# Patient Record
Sex: MALE | Race: WHITE | NOT HISPANIC OR LATINO | Employment: OTHER | URBAN - METROPOLITAN AREA
[De-identification: names, ages, dates, MRNs, and addresses within clinical notes are randomized per-mention and may not be internally consistent; named-entity substitution may affect disease eponyms.]

---

## 2020-05-29 ENCOUNTER — HISTORICAL (OUTPATIENT)
Dept: LAB | Facility: HOSPITAL | Age: 60
End: 2020-05-29

## 2021-08-13 ENCOUNTER — HISTORICAL (OUTPATIENT)
Dept: LAB | Facility: HOSPITAL | Age: 61
End: 2021-08-13

## 2021-08-13 LAB
ABS NEUT (OLG): 3.08 X10(3)/MCL (ref 2.1–9.2)
ALBUMIN SERPL-MCNC: 3.8 GM/DL (ref 3.4–4.8)
ALBUMIN/GLOB SERPL: 1.2 RATIO (ref 1.1–2)
ALP SERPL-CCNC: 73 UNIT/L (ref 40–150)
ALT SERPL-CCNC: 30 UNIT/L (ref 0–55)
AST SERPL-CCNC: 28 UNIT/L (ref 5–34)
BILIRUB SERPL-MCNC: 0.6 MG/DL (ref 0.2–1.2)
BILIRUBIN DIRECT+TOT PNL SERPL-MCNC: 0.2 MG/DL (ref 0–0.5)
BILIRUBIN DIRECT+TOT PNL SERPL-MCNC: 0.4 MG/DL (ref 0–0.8)
BUN SERPL-MCNC: 19.2 MG/DL (ref 8.4–25.7)
CALCIUM SERPL-MCNC: 9.6 MG/DL (ref 8.8–10)
CHLORIDE SERPL-SCNC: 104 MMOL/L (ref 98–107)
CHOLEST SERPL-MCNC: 200 MG/DL
CHOLEST/HDLC SERPL: 5 {RATIO} (ref 0–5)
CO2 SERPL-SCNC: 31 MMOL/L (ref 23–31)
CORTIS SERPL-SCNC: 9.2 UG/DL
CREAT SERPL-MCNC: 1.24 MG/DL (ref 0.72–1.25)
DEPRECATED CALCIDIOL+CALCIFEROL SERPL-MC: 68.9 NG/ML (ref 30–80)
ERYTHROCYTE [DISTWIDTH] IN BLOOD BY AUTOMATED COUNT: 14.4 % (ref 11.5–17)
ESTRADIOL SERPL HS-MCNC: <24 PG/ML
GLOBULIN SER-MCNC: 3.3 GM/DL (ref 2.4–3.5)
GLUCOSE SERPL-MCNC: 102 MG/DL (ref 82–115)
HCT VFR BLD AUTO: 44.2 % (ref 42–52)
HDLC SERPL-MCNC: 43 MG/DL (ref 40–60)
HGB BLD-MCNC: 13.6 GM/DL (ref 14–18)
LDLC SERPL CALC-MCNC: 127 MG/DL (ref 50–140)
MCH RBC QN AUTO: 27.9 PG (ref 27–31)
MCHC RBC AUTO-ENTMCNC: 30.8 GM/DL (ref 33–36)
MCV RBC AUTO: 90.6 FL (ref 80–94)
NRBC BLD AUTO-RTO: 0 % (ref 0–0.2)
PLATELET # BLD AUTO: 299 X10(3)/MCL (ref 130–400)
PMV BLD AUTO: 9.5 FL (ref 7.4–10.4)
POTASSIUM SERPL-SCNC: 4.2 MMOL/L (ref 3.5–5.1)
PROT SERPL-MCNC: 7.1 GM/DL (ref 5.8–7.6)
PSA SERPL-MCNC: 0.82 NG/ML
RBC # BLD AUTO: 4.88 X10(6)/MCL (ref 4.7–6.1)
SODIUM SERPL-SCNC: 143 MMOL/L (ref 136–145)
T3FREE SERPL-MCNC: 2.53 PG/ML (ref 1.58–3.91)
T4 FREE SERPL-MCNC: 0.75 NG/DL (ref 0.7–1.48)
TESTOST SERPL-MCNC: 1146.6 NG/DL (ref 220.91–715.81)
TRIGL SERPL-MCNC: 148 MG/DL (ref 0–150)
TSH SERPL-ACNC: 1.04 UIU/ML (ref 0.35–4.94)
VIT B12 SERPL-MCNC: 763 PG/ML (ref 213–816)
VLDLC SERPL CALC-MCNC: 30 MG/DL
WBC # SPEC AUTO: 5.8 X10(3)/MCL (ref 4.5–11.5)

## 2022-07-11 ENCOUNTER — HOSPITAL ENCOUNTER (OUTPATIENT)
Dept: RADIOLOGY | Facility: HOSPITAL | Age: 62
Discharge: HOME | End: 2022-07-11
Attending: INTERNAL MEDICINE
Payer: COMMERCIAL

## 2022-07-11 DIAGNOSIS — R06.02 SOB (SHORTNESS OF BREATH) ON EXERTION: ICD-10-CM

## 2022-07-11 DIAGNOSIS — Z79.899 HIGH RISK MEDICATION USE: ICD-10-CM

## 2022-07-11 PROCEDURE — 71046 X-RAY EXAM CHEST 2 VIEWS: CPT | Mod: TC

## 2022-07-28 PROBLEM — E78.00 PURE HYPERCHOLESTEROLEMIA: Chronic | Status: ACTIVE | Noted: 2022-07-28

## 2022-07-28 PROBLEM — D50.9 MICROCYTIC ANEMIA: Chronic | Status: ACTIVE | Noted: 2022-07-28

## 2022-07-28 PROBLEM — E67.3 HYPERVITAMINOSIS D: Status: ACTIVE | Noted: 2022-07-28

## 2022-07-28 PROBLEM — E67.3 HYPERVITAMINOSIS D: Chronic | Status: ACTIVE | Noted: 2022-07-28

## 2022-07-28 PROBLEM — N18.31 STAGE 3A CHRONIC KIDNEY DISEASE: Status: ACTIVE | Noted: 2022-07-28

## 2022-07-28 PROBLEM — N18.31 STAGE 3A CHRONIC KIDNEY DISEASE: Chronic | Status: ACTIVE | Noted: 2022-07-28

## 2022-07-28 PROBLEM — D50.9 MICROCYTIC ANEMIA: Status: ACTIVE | Noted: 2022-07-28

## 2022-07-28 PROBLEM — E78.00 PURE HYPERCHOLESTEROLEMIA: Status: ACTIVE | Noted: 2022-07-28

## 2022-08-11 PROBLEM — Z86.16 PERSONAL HISTORY OF COVID-19: Status: ACTIVE | Noted: 2022-08-11

## 2022-08-11 PROBLEM — U09.9 POST-COVID SYNDROME: Status: ACTIVE | Noted: 2022-08-11

## 2023-02-07 ENCOUNTER — OFFICE VISIT (OUTPATIENT)
Dept: CARDIAC SURGERY | Facility: CLINIC | Age: 63
End: 2023-02-07
Payer: COMMERCIAL

## 2023-02-07 VITALS
BODY MASS INDEX: 23.52 KG/M2 | SYSTOLIC BLOOD PRESSURE: 115 MMHG | DIASTOLIC BLOOD PRESSURE: 74 MMHG | HEART RATE: 122 BPM | HEIGHT: 71 IN | WEIGHT: 168 LBS

## 2023-02-07 DIAGNOSIS — R13.10 DYSPHAGIA, UNSPECIFIED TYPE: ICD-10-CM

## 2023-02-07 DIAGNOSIS — C15.9 MALIGNANT NEOPLASM OF ESOPHAGUS, UNSPECIFIED LOCATION: Primary | ICD-10-CM

## 2023-02-07 DIAGNOSIS — J84.9 INTERSTITIAL LUNG DISEASE: Primary | ICD-10-CM

## 2023-02-07 PROCEDURE — 3078F PR MOST RECENT DIASTOLIC BLOOD PRESSURE < 80 MM HG: ICD-10-PCS | Mod: CPTII,,, | Performed by: SPECIALIST

## 2023-02-07 PROCEDURE — 3008F PR BODY MASS INDEX (BMI) DOCUMENTED: ICD-10-PCS | Mod: CPTII,,, | Performed by: SPECIALIST

## 2023-02-07 PROCEDURE — 3078F DIAST BP <80 MM HG: CPT | Mod: CPTII,,, | Performed by: SPECIALIST

## 2023-02-07 PROCEDURE — 1159F PR MEDICATION LIST DOCUMENTED IN MEDICAL RECORD: ICD-10-PCS | Mod: CPTII,,, | Performed by: SPECIALIST

## 2023-02-07 PROCEDURE — 3074F PR MOST RECENT SYSTOLIC BLOOD PRESSURE < 130 MM HG: ICD-10-PCS | Mod: CPTII,,, | Performed by: SPECIALIST

## 2023-02-07 PROCEDURE — 99203 OFFICE O/P NEW LOW 30 MIN: CPT | Mod: ,,, | Performed by: SPECIALIST

## 2023-02-07 PROCEDURE — 3008F BODY MASS INDEX DOCD: CPT | Mod: CPTII,,, | Performed by: SPECIALIST

## 2023-02-07 PROCEDURE — 1159F MED LIST DOCD IN RCRD: CPT | Mod: CPTII,,, | Performed by: SPECIALIST

## 2023-02-07 PROCEDURE — 3074F SYST BP LT 130 MM HG: CPT | Mod: CPTII,,, | Performed by: SPECIALIST

## 2023-02-07 PROCEDURE — 99203 PR OFFICE/OUTPT VISIT, NEW, LEVL III, 30-44 MIN: ICD-10-PCS | Mod: ,,, | Performed by: SPECIALIST

## 2023-02-07 RX ORDER — MONTELUKAST SODIUM 10 MG/1
10 TABLET ORAL
Status: ON HOLD | COMMUNITY
Start: 2022-08-20 | End: 2023-02-22 | Stop reason: HOSPADM

## 2023-02-07 RX ORDER — ACETAMINOPHEN 500 MG
400 TABLET ORAL
Status: ON HOLD | COMMUNITY
End: 2023-03-15

## 2023-02-07 RX ORDER — THYROID 60 MG/1
60 TABLET ORAL
Status: ON HOLD | COMMUNITY
Start: 2022-04-03 | End: 2023-02-22 | Stop reason: HOSPADM

## 2023-02-07 RX ORDER — ENOXAPARIN SODIUM 100 MG/ML
80 INJECTION SUBCUTANEOUS
Status: ON HOLD | COMMUNITY
End: 2023-02-22 | Stop reason: HOSPADM

## 2023-02-07 RX ORDER — ESOMEPRAZOLE MAGNESIUM 40 MG/1
40 CAPSULE, DELAYED RELEASE ORAL
Status: ON HOLD | COMMUNITY
Start: 2022-09-14 | End: 2023-03-15

## 2023-02-07 NOTE — PROGRESS NOTES
[facility]  History & Physical  Cardiothoracic Surgery    SUBJECTIVE:     Chief Complaint/Reason for Visit:   Chief Complaint   Patient presents with    Pre-op Exam     Ref  Dr Maximo Ellis Lung Biopsy for ILD        History of Present Illness:  Patient is a 62 y.o. male presents referred by Dr. Ellis for a open lung biopsy.  The patient has interstitial lung disease of uncertain etiology.  He is on chronic steroids.  The patient also had a recent diagnosis of esophageal cancer that was treated at [facility] with adjuvant therapy.  He is currently walking around with no oxygen but does require oxygen most of the day.  He gets short of breath with minimal activity and has a chronic cough.  On review of his CT scan there is diffuse interstitial disease bilaterally.  It does appear to be slightly more involved on the right.  We will plan for a right VATS lung biopsy of the upper and lower lobes.  I have discussed the risks, benefits, and alternatives in great detail with the patient and his wife.    Review of patient's allergies indicates:  No Known Allergies    Past Medical History:   Diagnosis Date    COVID-19     Esophageal cancer     cT2-3N1    HLD (hyperlipidemia)     Hypervitaminosis D 07/28/2022    Microcytic anemia 07/28/2022    Pure hypercholesterolemia 07/28/2022    Stage 3a chronic kidney disease 07/28/2022     Past Surgical History:   Procedure Laterality Date    BL strabismus surgery  1973     Family History   Problem Relation Age of Onset    Coronary artery disease Mother     Diabetes Mother     Heart failure Father      Social History     Tobacco Use    Smoking status: Never     Passive exposure: Past    Smokeless tobacco: Never        Review of Systems:  Review of Systems   Constitutional: Negative.   HENT: Negative.     Eyes: Negative.    Cardiovascular:  Positive for dyspnea on exertion.   Respiratory:  Positive for shortness of breath.    Endocrine: Negative.     Hematologic/Lymphatic: Negative.    Skin: Negative.    Musculoskeletal: Negative.    Gastrointestinal: Negative.    Genitourinary: Negative.    Neurological: Negative.    Psychiatric/Behavioral: Negative.     Allergic/Immunologic: Negative.      OBJECTIVE:     Vital Signs (Most Recent):  Pulse: (!) 122 (02/07/23 0940)  BP: 115/74 (02/07/23 0940)    Admission: Weight: 76.2 kg (168 lb) (02/07/23 0940)   Most Recent: Weight: 76.2 kg (168 lb) (02/07/23 0940)    Physical Exam:  Physical Exam  Vitals reviewed.   Constitutional:       Appearance: Normal appearance.   HENT:      Head: Normocephalic and atraumatic.   Eyes:      Pupils: Pupils are equal, round, and reactive to light.   Cardiovascular:      Rate and Rhythm: Normal rate and regular rhythm.      Pulses: Normal pulses.      Heart sounds: Normal heart sounds.   Pulmonary:      Effort: Pulmonary effort is normal.      Comments: Somewhat coarse breath sounds bilaterally  Abdominal:      Palpations: Abdomen is soft.   Musculoskeletal:         General: Normal range of motion.      Cervical back: Normal range of motion.   Skin:     General: Skin is warm.   Neurological:      General: No focal deficit present.      Mental Status: He is alert and oriented to person, place, and time.   Psychiatric:         Mood and Affect: Mood normal.         Behavior: Behavior normal.       Diagnostic Results:  Reviewed CT scan and PFT results      ASSESSMENT/PLAN:     1. Dysphagia, unspecified type    Bilateral interstitial lung disease   Esophageal cancer status post adjuvant therapy  Chronic kidney disease stage 3   Microcytic anemia   Hyperlipidemia   Steroid dependency  Planning right VATS lung biopsy

## 2023-02-07 NOTE — H&P (VIEW-ONLY)
[facility]  History & Physical  Cardiothoracic Surgery    SUBJECTIVE:     Chief Complaint/Reason for Visit:   Chief Complaint   Patient presents with    Pre-op Exam     Ref  Dr Maximo Ellis Lung Biopsy for ILD        History of Present Illness:  Patient is a 62 y.o. male presents referred by Dr. Ellis for a open lung biopsy.  The patient has interstitial lung disease of uncertain etiology.  He is on chronic steroids.  The patient also had a recent diagnosis of esophageal cancer that was treated at [facility] with adjuvant therapy.  He is currently walking around with no oxygen but does require oxygen most of the day.  He gets short of breath with minimal activity and has a chronic cough.  On review of his CT scan there is diffuse interstitial disease bilaterally.  It does appear to be slightly more involved on the right.  We will plan for a right VATS lung biopsy of the upper and lower lobes.  I have discussed the risks, benefits, and alternatives in great detail with the patient and his wife.    Review of patient's allergies indicates:  No Known Allergies    Past Medical History:   Diagnosis Date    COVID-19     Esophageal cancer     cT2-3N1    HLD (hyperlipidemia)     Hypervitaminosis D 07/28/2022    Microcytic anemia 07/28/2022    Pure hypercholesterolemia 07/28/2022    Stage 3a chronic kidney disease 07/28/2022     Past Surgical History:   Procedure Laterality Date    BL strabismus surgery  1973     Family History   Problem Relation Age of Onset    Coronary artery disease Mother     Diabetes Mother     Heart failure Father      Social History     Tobacco Use    Smoking status: Never     Passive exposure: Past    Smokeless tobacco: Never        Review of Systems:  Review of Systems   Constitutional: Negative.   HENT: Negative.     Eyes: Negative.    Cardiovascular:  Positive for dyspnea on exertion.   Respiratory:  Positive for shortness of breath.    Endocrine: Negative.     Hematologic/Lymphatic: Negative.    Skin: Negative.    Musculoskeletal: Negative.    Gastrointestinal: Negative.    Genitourinary: Negative.    Neurological: Negative.    Psychiatric/Behavioral: Negative.     Allergic/Immunologic: Negative.      OBJECTIVE:     Vital Signs (Most Recent):  Pulse: (!) 122 (02/07/23 0940)  BP: 115/74 (02/07/23 0940)    Admission: Weight: 76.2 kg (168 lb) (02/07/23 0940)   Most Recent: Weight: 76.2 kg (168 lb) (02/07/23 0940)    Physical Exam:  Physical Exam  Vitals reviewed.   Constitutional:       Appearance: Normal appearance.   HENT:      Head: Normocephalic and atraumatic.   Eyes:      Pupils: Pupils are equal, round, and reactive to light.   Cardiovascular:      Rate and Rhythm: Normal rate and regular rhythm.      Pulses: Normal pulses.      Heart sounds: Normal heart sounds.   Pulmonary:      Effort: Pulmonary effort is normal.      Comments: Somewhat coarse breath sounds bilaterally  Abdominal:      Palpations: Abdomen is soft.   Musculoskeletal:         General: Normal range of motion.      Cervical back: Normal range of motion.   Skin:     General: Skin is warm.   Neurological:      General: No focal deficit present.      Mental Status: He is alert and oriented to person, place, and time.   Psychiatric:         Mood and Affect: Mood normal.         Behavior: Behavior normal.       Diagnostic Results:  Reviewed CT scan and PFT results      ASSESSMENT/PLAN:     1. Dysphagia, unspecified type    Bilateral interstitial lung disease   Esophageal cancer status post adjuvant therapy  Chronic kidney disease stage 3   Microcytic anemia   Hyperlipidemia   Steroid dependency  Planning right VATS lung biopsy

## 2023-02-08 ENCOUNTER — HOSPITAL ENCOUNTER (OUTPATIENT)
Dept: RADIOLOGY | Facility: HOSPITAL | Age: 63
Discharge: HOME | End: 2023-02-08
Attending: INTERNAL MEDICINE
Payer: COMMERCIAL

## 2023-02-08 ENCOUNTER — CLINICAL SUPPORT (OUTPATIENT)
Dept: REHABILITATION | Facility: HOSPITAL | Age: 63
End: 2023-02-08
Attending: INTERNAL MEDICINE
Payer: COMMERCIAL

## 2023-02-08 DIAGNOSIS — J84.9 ILD (INTERSTITIAL LUNG DISEASE): ICD-10-CM

## 2023-02-08 DIAGNOSIS — R13.10 DYSPHAGIA, UNSPECIFIED TYPE: ICD-10-CM

## 2023-02-08 PROCEDURE — 74230 X-RAY XM SWLNG FUNCJ C+: CPT | Mod: TC

## 2023-02-08 PROCEDURE — 92611 MOTION FLUOROSCOPY/SWALLOW: CPT

## 2023-02-08 NOTE — PROGRESS NOTES
Speech Language Pathology Department  Modified Barium Swallow Study    Patient Name:  Eagle Patterson   MRN:  [id number]      Recommendations:     Repeat MBS study: Not indicated at this time  Diet recommendations:  Regular solids and thin liquids  Swallow strategies/precautions: small bites/sips, slow rate, and medications per patient preference  General precautions: Standard,      Reason for Referral:     A Modified Barium Swallow Study was completed to assess the efficiency of his/her swallow function, rule out aspiration and make recommendations regarding safe dietary consistencies, effective compensatory strategies, and safe eating environment.     History:     Past Medical History:   Diagnosis Date    COVID-19     Esophageal cancer     cT2-3N1    HLD (hyperlipidemia)     Hypervitaminosis D 07/28/2022    Microcytic anemia 07/28/2022    Pure hypercholesterolemia 07/28/2022    Stage 3a chronic kidney disease 07/28/2022     Past Surgical History:   Procedure Laterality Date    BL strabismus surgery  1973       Home Diet: Regular and thin liquids    Patient complaint: Pt with no complaints at this time. States he occasionally chokes while eating and drinking.     Subjective:     Patient alert, calm, and cooperative.    Spiritual/Cultural/Denominational Beliefs/Practices that affect care: no  Pain/Comfort: none  Respiratory Status: Nasal cannula    Fluoroscopic Results:     Oral Musculature Evaluation:  Adequate dentition  Adequate secretion management    Visualization  Patient was seen in the lateral view    Oral Phase:   Adequate lip closure  Adequate bolus formation  Adequate anterior-posterior transport  Adequate mastication    Pharyngeal Phase:   Swallow delay with spill to the pyriform sinuses  Reduced base of tongue retraction  Reduced epiglottic deflection  Adequate airway protection  Min residue observed and cleared with a secondary swallow   Consistency Laryngeal Penetration Aspiration   Mildly thick  liquid by spoon None None   Mildly thick liquid by cup None None   Thin liquid by cup None None   Puree None None   Chewable solid None None   Thin liquid by straw None None       Cervical Esophageal Phase:   residual material visualized; no retrograde movement observed      Assessment:     Pt with WFL oropharyngeal swallow with no laryngeal penetration/aspiration observed.     Goals:     Patient Education:     Patient and spouse provided with verbal education regarding results/recommendations.  Understanding was verbalized.    Time Tracking:     SLP Treatment Date:   2/8/23  Speech Total Time (min):  30    Billable minutes:   Motion Fluoroscopic Evaluation, Video Recording, 30 02/08/2023

## 2023-02-14 ENCOUNTER — ANESTHESIA EVENT (OUTPATIENT)
Dept: SURGERY | Facility: HOSPITAL | Age: 63
DRG: 166 | End: 2023-02-14
Payer: COMMERCIAL

## 2023-02-14 ENCOUNTER — HOSPITAL ENCOUNTER (OUTPATIENT)
Dept: RADIOLOGY | Facility: HOSPITAL | Age: 63
Discharge: HOME | DRG: 166 | End: 2023-02-14
Attending: SPECIALIST
Payer: COMMERCIAL

## 2023-02-14 DIAGNOSIS — J84.9 INTERSTITIAL LUNG DISEASE: ICD-10-CM

## 2023-02-14 PROCEDURE — 71046 X-RAY EXAM CHEST 2 VIEWS: CPT | Mod: TC

## 2023-02-15 ENCOUNTER — ANESTHESIA (OUTPATIENT)
Dept: SURGERY | Facility: HOSPITAL | Age: 63
DRG: 166 | End: 2023-02-15
Payer: COMMERCIAL

## 2023-02-15 ENCOUNTER — HOSPITAL ENCOUNTER (INPATIENT)
Facility: HOSPITAL | Age: 63
LOS: 7 days | Discharge: NURSING, REHAB OR LONG-TERM CARE | DRG: 166 | End: 2023-02-22
Attending: SPECIALIST | Admitting: SPECIALIST
Payer: COMMERCIAL

## 2023-02-15 DIAGNOSIS — J84.9 INTERSTITIAL LUNG DISEASE: ICD-10-CM

## 2023-02-15 DIAGNOSIS — J96.21 ACUTE AND CHRONIC RESPIRATORY FAILURE WITH HYPOXIA: ICD-10-CM

## 2023-02-15 DIAGNOSIS — R91.8 LUNG MASS: ICD-10-CM

## 2023-02-15 LAB
BASOPHILS # BLD AUTO: 0 X10(3)/MCL (ref 0–0.2)
BASOPHILS # BLD AUTO: 0.02 X10(3)/MCL (ref 0–0.2)
BASOPHILS NFR BLD AUTO: 0 %
BASOPHILS NFR BLD AUTO: 0.2 %
EOSINOPHIL # BLD AUTO: 0.01 X10(3)/MCL (ref 0–0.9)
EOSINOPHIL # BLD AUTO: 0.01 X10(3)/MCL (ref 0–0.9)
EOSINOPHIL NFR BLD AUTO: 0.1 %
EOSINOPHIL NFR BLD AUTO: 0.2 %
ERYTHROCYTE [DISTWIDTH] IN BLOOD BY AUTOMATED COUNT: 19.9 % (ref 11.5–17)
ERYTHROCYTE [DISTWIDTH] IN BLOOD BY AUTOMATED COUNT: 19.9 % (ref 11.5–17)
HCT VFR BLD AUTO: 23.5 % (ref 42–52)
HCT VFR BLD AUTO: 47.2 % (ref 42–52)
HGB BLD-MCNC: 14.7 GM/DL (ref 14–18)
HGB BLD-MCNC: 6.8 GM/DL (ref 14–18)
IMM GRANULOCYTES # BLD AUTO: 0.02 X10(3)/MCL (ref 0–0.04)
IMM GRANULOCYTES # BLD AUTO: 0.06 X10(3)/MCL (ref 0–0.04)
IMM GRANULOCYTES NFR BLD AUTO: 0.4 %
IMM GRANULOCYTES NFR BLD AUTO: 0.6 %
LYMPHOCYTES # BLD AUTO: 0.08 X10(3)/MCL (ref 0.6–4.6)
LYMPHOCYTES # BLD AUTO: 0.16 X10(3)/MCL (ref 0.6–4.6)
LYMPHOCYTES NFR BLD AUTO: 1.5 %
LYMPHOCYTES NFR BLD AUTO: 1.7 %
MCH RBC QN AUTO: 29.1 PG
MCH RBC QN AUTO: 29.3 PG
MCHC RBC AUTO-ENTMCNC: 28.9 MG/DL (ref 33–36)
MCHC RBC AUTO-ENTMCNC: 31.1 MG/DL (ref 33–36)
MCV RBC AUTO: 101.3 FL (ref 80–94)
MCV RBC AUTO: 93.3 FL (ref 80–94)
MONOCYTES # BLD AUTO: 0.28 X10(3)/MCL (ref 0.1–1.3)
MONOCYTES # BLD AUTO: 0.8 X10(3)/MCL (ref 0.1–1.3)
MONOCYTES NFR BLD AUTO: 6 %
MONOCYTES NFR BLD AUTO: 7.7 %
NEUTROPHILS # BLD AUTO: 4.31 X10(3)/MCL (ref 2.1–9.2)
NEUTROPHILS # BLD AUTO: 9.3 X10(3)/MCL (ref 2.1–9.2)
NEUTROPHILS NFR BLD AUTO: 89.9 %
NEUTROPHILS NFR BLD AUTO: 91.7 %
NRBC BLD AUTO-RTO: 0 %
NRBC BLD AUTO-RTO: 0 %
PLATELET # BLD AUTO: 119 X10(3)/MCL (ref 130–400)
PLATELET # BLD AUTO: 264 X10(3)/MCL (ref 130–400)
PMV BLD AUTO: 9.8 FL (ref 7.4–10.4)
PMV BLD AUTO: 9.9 FL (ref 7.4–10.4)
RBC # BLD AUTO: 2.32 X10(6)/MCL (ref 4.7–6.1)
RBC # BLD AUTO: 5.06 X10(6)/MCL (ref 4.7–6.1)
WBC # SPEC AUTO: 10.4 X10(3)/MCL (ref 4.5–11.5)
WBC # SPEC AUTO: 4.7 X10(3)/MCL (ref 4.5–11.5)

## 2023-02-15 PROCEDURE — 27000221 HC OXYGEN, UP TO 24 HOURS

## 2023-02-15 PROCEDURE — 63600175 PHARM REV CODE 636 W HCPCS: Performed by: SPECIALIST

## 2023-02-15 PROCEDURE — 63600175 PHARM REV CODE 636 W HCPCS

## 2023-02-15 PROCEDURE — 71000016 HC POSTOP RECOV ADDL HR: Performed by: SPECIALIST

## 2023-02-15 PROCEDURE — 37000009 HC ANESTHESIA EA ADD 15 MINS: Performed by: SPECIALIST

## 2023-02-15 PROCEDURE — 85025 COMPLETE CBC W/AUTO DIFF WBC: CPT | Performed by: SPECIALIST

## 2023-02-15 PROCEDURE — 21400001 HC TELEMETRY ROOM

## 2023-02-15 PROCEDURE — 36000710: Performed by: SPECIALIST

## 2023-02-15 PROCEDURE — 32607 THORACOSCOPY W/BX INFILTRATE: CPT | Mod: RT,,, | Performed by: SPECIALIST

## 2023-02-15 PROCEDURE — 25000242 PHARM REV CODE 250 ALT 637 W/ HCPCS: Performed by: INTERNAL MEDICINE

## 2023-02-15 PROCEDURE — 94761 N-INVAS EAR/PLS OXIMETRY MLT: CPT

## 2023-02-15 PROCEDURE — 25000003 PHARM REV CODE 250

## 2023-02-15 PROCEDURE — 87075 CULTR BACTERIA EXCEPT BLOOD: CPT | Performed by: SPECIALIST

## 2023-02-15 PROCEDURE — 99900035 HC TECH TIME PER 15 MIN (STAT)

## 2023-02-15 PROCEDURE — 88325 CONSLTJ COMPRE RVW REC REPRT: CPT

## 2023-02-15 PROCEDURE — 87205 SMEAR GRAM STAIN: CPT | Performed by: SPECIALIST

## 2023-02-15 PROCEDURE — 87206 SMEAR FLUORESCENT/ACID STAI: CPT | Performed by: SPECIALIST

## 2023-02-15 PROCEDURE — 71000015 HC POSTOP RECOV 1ST HR: Performed by: SPECIALIST

## 2023-02-15 PROCEDURE — 32607 PR THORACOSCOPY W/BX INFILTRATE: ICD-10-PCS | Mod: RT,,, | Performed by: SPECIALIST

## 2023-02-15 PROCEDURE — 25000003 PHARM REV CODE 250: Performed by: SPECIALIST

## 2023-02-15 PROCEDURE — C1729 CATH, DRAINAGE: HCPCS | Performed by: SPECIALIST

## 2023-02-15 PROCEDURE — 27201423 OPTIME MED/SURG SUP & DEVICES STERILE SUPPLY: Performed by: SPECIALIST

## 2023-02-15 PROCEDURE — 71000033 HC RECOVERY, INTIAL HOUR: Performed by: SPECIALIST

## 2023-02-15 PROCEDURE — 87070 CULTURE OTHR SPECIMN AEROBIC: CPT | Performed by: SPECIALIST

## 2023-02-15 PROCEDURE — 71000039 HC RECOVERY, EACH ADD'L HOUR: Performed by: SPECIALIST

## 2023-02-15 PROCEDURE — 36000711: Performed by: SPECIALIST

## 2023-02-15 PROCEDURE — 87102 FUNGUS ISOLATION CULTURE: CPT | Performed by: SPECIALIST

## 2023-02-15 PROCEDURE — 37000008 HC ANESTHESIA 1ST 15 MINUTES: Performed by: SPECIALIST

## 2023-02-15 RX ORDER — METOCLOPRAMIDE HYDROCHLORIDE 5 MG/ML
5 INJECTION INTRAMUSCULAR; INTRAVENOUS EVERY 6 HOURS PRN
Status: DISCONTINUED | OUTPATIENT
Start: 2023-02-15 | End: 2023-02-22 | Stop reason: HOSPADM

## 2023-02-15 RX ORDER — FENTANYL CITRATE 50 UG/ML
25 INJECTION, SOLUTION INTRAMUSCULAR; INTRAVENOUS EVERY 5 MIN PRN
Status: DISCONTINUED | OUTPATIENT
Start: 2023-02-15 | End: 2023-02-15

## 2023-02-15 RX ORDER — ACETAMINOPHEN 325 MG/1
650 TABLET ORAL EVERY 4 HOURS PRN
Status: DISCONTINUED | OUTPATIENT
Start: 2023-02-15 | End: 2023-02-22 | Stop reason: HOSPADM

## 2023-02-15 RX ORDER — BENZONATATE 100 MG/1
100 CAPSULE ORAL 3 TIMES DAILY PRN
Status: ON HOLD | COMMUNITY
End: 2023-03-15 | Stop reason: SDUPTHER

## 2023-02-15 RX ORDER — OXYCODONE HYDROCHLORIDE 5 MG/1
5 TABLET ORAL EVERY 4 HOURS PRN
Status: DISCONTINUED | OUTPATIENT
Start: 2023-02-15 | End: 2023-02-15

## 2023-02-15 RX ORDER — PROMETHAZINE HYDROCHLORIDE 25 MG/ML
INJECTION, SOLUTION INTRAMUSCULAR; INTRAVENOUS
Status: COMPLETED
Start: 2023-02-15 | End: 2023-02-15

## 2023-02-15 RX ORDER — OXYCODONE HYDROCHLORIDE 5 MG/1
10 TABLET ORAL EVERY 4 HOURS PRN
Status: DISCONTINUED | OUTPATIENT
Start: 2023-02-16 | End: 2023-02-22 | Stop reason: HOSPADM

## 2023-02-15 RX ORDER — LOPERAMIDE HYDROCHLORIDE 2 MG/1
4 CAPSULE ORAL ONCE
Status: DISCONTINUED | OUTPATIENT
Start: 2023-02-15 | End: 2023-02-22 | Stop reason: HOSPADM

## 2023-02-15 RX ORDER — ENOXAPARIN SODIUM 100 MG/ML
40 INJECTION SUBCUTANEOUS EVERY 24 HOURS
Status: DISCONTINUED | OUTPATIENT
Start: 2023-02-15 | End: 2023-02-22 | Stop reason: HOSPADM

## 2023-02-15 RX ORDER — ACETAMINOPHEN 10 MG/ML
1000 INJECTION, SOLUTION INTRAVENOUS ONCE
Status: CANCELLED | OUTPATIENT
Start: 2023-02-15 | End: 2023-02-15

## 2023-02-15 RX ORDER — VANCOMYCIN HCL IN 5 % DEXTROSE 1G/250ML
1000 PLASTIC BAG, INJECTION (ML) INTRAVENOUS
Status: COMPLETED | OUTPATIENT
Start: 2023-02-15 | End: 2023-02-16

## 2023-02-15 RX ORDER — SODIUM CHLORIDE 450 MG/100ML
75 INJECTION, SOLUTION INTRAVENOUS CONTINUOUS
Status: ACTIVE | OUTPATIENT
Start: 2023-02-15 | End: 2023-02-16

## 2023-02-15 RX ORDER — ROCURONIUM BROMIDE 10 MG/ML
INJECTION, SOLUTION INTRAVENOUS
Status: DISCONTINUED | OUTPATIENT
Start: 2023-02-15 | End: 2023-02-15

## 2023-02-15 RX ORDER — FAMOTIDINE 20 MG/1
20 TABLET, FILM COATED ORAL 2 TIMES DAILY
Status: DISCONTINUED | OUTPATIENT
Start: 2023-02-15 | End: 2023-02-22 | Stop reason: HOSPADM

## 2023-02-15 RX ORDER — VANCOMYCIN HYDROCHLORIDE 1 G/20ML
INJECTION, POWDER, LYOPHILIZED, FOR SOLUTION INTRAVENOUS
Status: DISPENSED
Start: 2023-02-15 | End: 2023-02-15

## 2023-02-15 RX ORDER — HYDROMORPHONE HYDROCHLORIDE 2 MG/ML
0.5 INJECTION, SOLUTION INTRAMUSCULAR; INTRAVENOUS; SUBCUTANEOUS EVERY 5 MIN PRN
Status: DISCONTINUED | OUTPATIENT
Start: 2023-02-15 | End: 2023-02-15

## 2023-02-15 RX ORDER — IPRATROPIUM BROMIDE AND ALBUTEROL SULFATE 2.5; .5 MG/3ML; MG/3ML
3 SOLUTION RESPIRATORY (INHALATION) EVERY 6 HOURS
Status: DISCONTINUED | OUTPATIENT
Start: 2023-02-16 | End: 2023-02-22 | Stop reason: HOSPADM

## 2023-02-15 RX ORDER — ACETAMINOPHEN 10 MG/ML
INJECTION, SOLUTION INTRAVENOUS
Status: DISCONTINUED | OUTPATIENT
Start: 2023-02-15 | End: 2023-02-15

## 2023-02-15 RX ORDER — LIDOCAINE HYDROCHLORIDE 10 MG/ML
1 INJECTION, SOLUTION EPIDURAL; INFILTRATION; INTRACAUDAL; PERINEURAL ONCE
Status: CANCELLED | OUTPATIENT
Start: 2023-02-15 | End: 2023-02-15

## 2023-02-15 RX ORDER — BENZONATATE 100 MG/1
100 CAPSULE ORAL 3 TIMES DAILY PRN
Status: DISCONTINUED | OUTPATIENT
Start: 2023-02-15 | End: 2023-02-22 | Stop reason: HOSPADM

## 2023-02-15 RX ORDER — DIPHENHYDRAMINE HYDROCHLORIDE 50 MG/ML
INJECTION INTRAMUSCULAR; INTRAVENOUS
Status: COMPLETED
Start: 2023-02-15 | End: 2023-02-15

## 2023-02-15 RX ORDER — METHOCARBAMOL 100 MG/ML
INJECTION, SOLUTION INTRAMUSCULAR; INTRAVENOUS
Status: COMPLETED
Start: 2023-02-15 | End: 2023-02-15

## 2023-02-15 RX ORDER — CEFAZOLIN SODIUM 2 G/50ML
2 SOLUTION INTRAVENOUS
Status: COMPLETED | OUTPATIENT
Start: 2023-02-15 | End: 2023-02-16

## 2023-02-15 RX ORDER — METHOCARBAMOL 100 MG/ML
1000 INJECTION, SOLUTION INTRAMUSCULAR; INTRAVENOUS ONCE
Status: COMPLETED | OUTPATIENT
Start: 2023-02-15 | End: 2023-02-15

## 2023-02-15 RX ORDER — SODIUM CHLORIDE 0.9 % (FLUSH) 0.9 %
10 SYRINGE (ML) INJECTION
Status: DISCONTINUED | OUTPATIENT
Start: 2023-02-15 | End: 2023-02-22 | Stop reason: HOSPADM

## 2023-02-15 RX ORDER — HYDROMORPHONE HYDROCHLORIDE 2 MG/ML
INJECTION, SOLUTION INTRAMUSCULAR; INTRAVENOUS; SUBCUTANEOUS
Status: DISCONTINUED | OUTPATIENT
Start: 2023-02-15 | End: 2023-02-15

## 2023-02-15 RX ORDER — PHENYLEPHRINE HCL IN 0.9% NACL 1 MG/10 ML
SYRINGE (ML) INTRAVENOUS
Status: DISCONTINUED | OUTPATIENT
Start: 2023-02-15 | End: 2023-02-15

## 2023-02-15 RX ORDER — PROPOFOL 10 MG/ML
VIAL (ML) INTRAVENOUS
Status: DISCONTINUED | OUTPATIENT
Start: 2023-02-15 | End: 2023-02-15

## 2023-02-15 RX ORDER — DIPHENHYDRAMINE HYDROCHLORIDE 50 MG/ML
25 INJECTION INTRAMUSCULAR; INTRAVENOUS ONCE AS NEEDED
Status: COMPLETED | OUTPATIENT
Start: 2023-02-15 | End: 2023-02-15

## 2023-02-15 RX ORDER — MIDAZOLAM HYDROCHLORIDE 1 MG/ML
INJECTION INTRAMUSCULAR; INTRAVENOUS
Status: DISCONTINUED | OUTPATIENT
Start: 2023-02-15 | End: 2023-02-15

## 2023-02-15 RX ORDER — IPRATROPIUM BROMIDE AND ALBUTEROL SULFATE 2.5; .5 MG/3ML; MG/3ML
3 SOLUTION RESPIRATORY (INHALATION) EVERY 6 HOURS PRN
Status: DISCONTINUED | OUTPATIENT
Start: 2023-02-15 | End: 2023-02-15

## 2023-02-15 RX ORDER — KETOROLAC TROMETHAMINE 30 MG/ML
15 INJECTION, SOLUTION INTRAMUSCULAR; INTRAVENOUS 4 TIMES DAILY
Status: COMPLETED | OUTPATIENT
Start: 2023-02-15 | End: 2023-02-17

## 2023-02-15 RX ORDER — LIDOCAINE HYDROCHLORIDE 20 MG/ML
INJECTION, SOLUTION EPIDURAL; INFILTRATION; INTRACAUDAL; PERINEURAL
Status: DISCONTINUED | OUTPATIENT
Start: 2023-02-15 | End: 2023-02-15

## 2023-02-15 RX ORDER — TALC
6 POWDER (GRAM) TOPICAL NIGHTLY PRN
Status: DISCONTINUED | OUTPATIENT
Start: 2023-02-15 | End: 2023-02-22 | Stop reason: HOSPADM

## 2023-02-15 RX ORDER — DOCUSATE SODIUM 100 MG/1
100 CAPSULE, LIQUID FILLED ORAL 2 TIMES DAILY
Status: DISCONTINUED | OUTPATIENT
Start: 2023-02-15 | End: 2023-02-22 | Stop reason: HOSPADM

## 2023-02-15 RX ORDER — FENTANYL CITRATE 50 UG/ML
INJECTION, SOLUTION INTRAMUSCULAR; INTRAVENOUS
Status: DISCONTINUED | OUTPATIENT
Start: 2023-02-15 | End: 2023-02-15

## 2023-02-15 RX ORDER — ONDANSETRON 4 MG/1
8 TABLET, ORALLY DISINTEGRATING ORAL EVERY 8 HOURS PRN
Status: DISCONTINUED | OUTPATIENT
Start: 2023-02-15 | End: 2023-02-22 | Stop reason: HOSPADM

## 2023-02-15 RX ORDER — OXYCODONE HYDROCHLORIDE 5 MG/1
5 TABLET ORAL EVERY 4 HOURS PRN
Status: DISCONTINUED | OUTPATIENT
Start: 2023-02-15 | End: 2023-02-22 | Stop reason: HOSPADM

## 2023-02-15 RX ORDER — MUPIROCIN 20 MG/G
1 OINTMENT TOPICAL 2 TIMES DAILY
Status: DISPENSED | OUTPATIENT
Start: 2023-02-15 | End: 2023-02-17

## 2023-02-15 RX ORDER — PROMETHAZINE HYDROCHLORIDE 25 MG/ML
12.5 INJECTION, SOLUTION INTRAMUSCULAR; INTRAVENOUS ONCE AS NEEDED
Status: COMPLETED | OUTPATIENT
Start: 2023-02-15 | End: 2023-02-15

## 2023-02-15 RX ORDER — ONDANSETRON HYDROCHLORIDE 2 MG/ML
INJECTION, SOLUTION INTRAMUSCULAR; INTRAVENOUS
Status: DISCONTINUED | OUTPATIENT
Start: 2023-02-15 | End: 2023-02-15

## 2023-02-15 RX ADMIN — HYDROMORPHONE HYDROCHLORIDE 0.5 MG: 2 INJECTION INTRAMUSCULAR; INTRAVENOUS; SUBCUTANEOUS at 09:02

## 2023-02-15 RX ADMIN — ACETAMINOPHEN 1000 MG: 10 INJECTION, SOLUTION INTRAVENOUS at 07:02

## 2023-02-15 RX ADMIN — KETOROLAC TROMETHAMINE 15 MG: 30 INJECTION, SOLUTION INTRAMUSCULAR; INTRAVENOUS at 11:02

## 2023-02-15 RX ADMIN — PROPOFOL 180 MG: 10 INJECTION, EMULSION INTRAVENOUS at 07:02

## 2023-02-15 RX ADMIN — METHOCARBAMOL 1000 MG: 100 INJECTION, SOLUTION INTRAMUSCULAR; INTRAVENOUS at 08:02

## 2023-02-15 RX ADMIN — IPRATROPIUM BROMIDE AND ALBUTEROL SULFATE 3 ML: 2.5; .5 SOLUTION RESPIRATORY (INHALATION) at 10:02

## 2023-02-15 RX ADMIN — CEFAZOLIN SODIUM 2 G: 2 SOLUTION INTRAVENOUS at 05:02

## 2023-02-15 RX ADMIN — DOCUSATE SODIUM 100 MG: 100 CAPSULE, LIQUID FILLED ORAL at 08:02

## 2023-02-15 RX ADMIN — VANCOMYCIN HYDROCHLORIDE 1000 MG: 1 INJECTION, POWDER, LYOPHILIZED, FOR SOLUTION INTRAVENOUS at 08:02

## 2023-02-15 RX ADMIN — FENTANYL CITRATE 100 MCG: 50 INJECTION, SOLUTION INTRAMUSCULAR; INTRAVENOUS at 07:02

## 2023-02-15 RX ADMIN — VANCOMYCIN HYDROCHLORIDE 1000 MG: 1 INJECTION, POWDER, LYOPHILIZED, FOR SOLUTION INTRAVENOUS at 11:02

## 2023-02-15 RX ADMIN — KETOROLAC TROMETHAMINE 15 MG: 30 INJECTION, SOLUTION INTRAMUSCULAR; INTRAVENOUS at 05:02

## 2023-02-15 RX ADMIN — HYDROMORPHONE HYDROCHLORIDE 1.6 MG: 2 INJECTION INTRAMUSCULAR; INTRAVENOUS; SUBCUTANEOUS at 08:02

## 2023-02-15 RX ADMIN — KETOROLAC TROMETHAMINE 15 MG: 30 INJECTION, SOLUTION INTRAMUSCULAR; INTRAVENOUS at 10:02

## 2023-02-15 RX ADMIN — SODIUM CHLORIDE 75 ML/HR: 4.5 INJECTION, SOLUTION INTRAVENOUS at 11:02

## 2023-02-15 RX ADMIN — HYDROMORPHONE HYDROCHLORIDE 2 MG: 2 INJECTION INTRAMUSCULAR; INTRAVENOUS; SUBCUTANEOUS at 08:02

## 2023-02-15 RX ADMIN — DIPHENHYDRAMINE HYDROCHLORIDE 25 MG: 50 INJECTION INTRAMUSCULAR; INTRAVENOUS at 11:02

## 2023-02-15 RX ADMIN — HYDROMORPHONE HYDROCHLORIDE 0.5 MG: 2 INJECTION INTRAMUSCULAR; INTRAVENOUS; SUBCUTANEOUS at 10:02

## 2023-02-15 RX ADMIN — CEFUROXIME SODIUM 1.5 G: 1.5 INJECTION, POWDER, FOR SOLUTION INTRAVENOUS at 07:02

## 2023-02-15 RX ADMIN — SODIUM CHLORIDE 100 MG: 9 INJECTION, SOLUTION INTRAVENOUS at 07:02

## 2023-02-15 RX ADMIN — HYDROMORPHONE HYDROCHLORIDE 0.5 MG: 2 INJECTION INTRAMUSCULAR; INTRAVENOUS; SUBCUTANEOUS at 01:02

## 2023-02-15 RX ADMIN — Medication 100 MCG: at 07:02

## 2023-02-15 RX ADMIN — PROMETHAZINE HYDROCHLORIDE 12.5 MG: 25 INJECTION INTRAMUSCULAR; INTRAVENOUS at 08:02

## 2023-02-15 RX ADMIN — MUPIROCIN 1 G: 20 OINTMENT TOPICAL at 08:02

## 2023-02-15 RX ADMIN — FAMOTIDINE 20 MG: 20 TABLET, FILM COATED ORAL at 08:02

## 2023-02-15 RX ADMIN — PHENYLEPHRINE HYDROCHLORIDE 25 MCG/MIN: 10 INJECTION INTRAVENOUS at 07:02

## 2023-02-15 RX ADMIN — LIDOCAINE HYDROCHLORIDE 80 MG: 20 INJECTION, SOLUTION EPIDURAL; INFILTRATION; INTRACAUDAL; PERINEURAL at 07:02

## 2023-02-15 RX ADMIN — SODIUM CHLORIDE, SODIUM GLUCONATE, SODIUM ACETATE, POTASSIUM CHLORIDE AND MAGNESIUM CHLORIDE: 526; 502; 368; 37; 30 INJECTION, SOLUTION INTRAVENOUS at 06:02

## 2023-02-15 RX ADMIN — MIDAZOLAM HYDROCHLORIDE 1 MG: 1 INJECTION, SOLUTION INTRAMUSCULAR; INTRAVENOUS at 06:02

## 2023-02-15 RX ADMIN — PROMETHAZINE HYDROCHLORIDE 12.5 MG: 25 INJECTION, SOLUTION INTRAMUSCULAR; INTRAVENOUS at 08:02

## 2023-02-15 RX ADMIN — HYDROMORPHONE HYDROCHLORIDE 0.4 MG: 2 INJECTION INTRAMUSCULAR; INTRAVENOUS; SUBCUTANEOUS at 08:02

## 2023-02-15 RX ADMIN — ROCURONIUM BROMIDE 50 MG: 10 SOLUTION INTRAVENOUS at 07:02

## 2023-02-15 RX ADMIN — ENOXAPARIN SODIUM 40 MG: 40 INJECTION SUBCUTANEOUS at 05:02

## 2023-02-15 RX ADMIN — OXYCODONE HYDROCHLORIDE 5 MG: 5 TABLET ORAL at 08:02

## 2023-02-15 RX ADMIN — ONDANSETRON HYDROCHLORIDE 4 MG: 2 INJECTION, SOLUTION INTRAMUSCULAR; INTRAVENOUS at 07:02

## 2023-02-15 RX ADMIN — SUGAMMADEX 200 MG: 100 INJECTION, SOLUTION INTRAVENOUS at 08:02

## 2023-02-15 NOTE — ANESTHESIA PROCEDURE NOTES
Arterial    Diagnosis: VATS procedure    Patient location during procedure: done in OR  Procedure start time: 2/15/2023 7:04 AM  Timeout: 2/15/2023 7:03 AM  Procedure end time: 2/15/2023 7:05 AM    Staffing  Authorizing Provider: Barrington Bolton DO  Performing Provider: James Thrasher    Anesthesiologist was present at the time of the procedure.    Preanesthetic Checklist  Completed: patient identified, IV checked, site marked, risks and benefits discussed, surgical consent, monitors and equipment checked, pre-op evaluation, timeout performed and anesthesia consent givenArterial  Skin Prep: chlorhexidine gluconate  Orientation: right  Location: radial    Catheter Size: 20 G  Catheter placement by Ultrasound guidance. Heme positive aspiration all ports.   Vessel Caliber: patent  Needle advanced into vessel with real time Ultrasound guidance.Insertion Attempts: 1  Assessment  Dressing: secured with tape and tegaderm  Patient: Tolerated well

## 2023-02-15 NOTE — OP NOTE
Date of service 02/15/2023   Preop diagnosis:  Interstitial lung disease   Postop diagnosis: Same  Procedure:  Right VATS wedge biopsy of upper and lower lobes  Surgeon:  Aliza  Assistant: Block  Anesthesia:  General endotracheal  Drains:  Chest tube x1     Procedure in detail:   The patient was taken to the operating room under informed consent placed on table in supine position.  General endotracheal anesthesia was induced by the anesthesia department.  He was rotated onto his left side all pressure points appropriately padded.  The right chest was prepped and draped in usual sterile fashion.  Three incisions made at the inferior costal margin in the chest cavity entered with the scope and then to instrumentation incisions.  With the endo stapler with Amanda strip reinforcements a wedge biopsy was taken of the lower lobe along the inferior rim and sent for permanent analysis and some sent for culture.  Next in a similar fashion a wedge biopsy was taken of the upper lobe with the endo stapler and Amanda strips.  This was sent for permanent analysis.  A 28 chest tube was brought in through 1 of the incisions and secured to the skin.  The remaining incisions closed with 2-0 Vicryl and the skin with a 3-0 subcuticular stitch.  The patient tolerated this procedure well.

## 2023-02-15 NOTE — TRANSFER OF CARE
"Anesthesia Transfer of Care Note    Patient: Eagle Patterson    Procedure(s) Performed: Procedure(s) (LRB):  VATS (VIDEO-ASSISTED THORACOSCOPIC SURGERY) (Right)    Patient location: PACU    Anesthesia Type: general    Transport from OR: Transported from OR on room air with adequate spontaneous ventilation    Post pain: adequate analgesia    Post assessment: no apparent anesthetic complications    Post vital signs: stable    Level of consciousness: awake    Nausea/Vomiting: no nausea/vomiting    Complications: none    Transfer of care protocol was followed      Last vitals:   Visit Vitals  /82   Pulse 110   Temp 36.5 °C (97.7 °F) (Oral)   Resp 20   Ht 5' 10" (1.778 m)   Wt 71.8 kg (158 lb 4.6 oz)   SpO2 (!) 93%   BMI 22.71 kg/m²     "

## 2023-02-15 NOTE — ANESTHESIA POSTPROCEDURE EVALUATION
Anesthesia Post Evaluation    Patient: Eagle Patterson    Procedure(s) Performed: Procedure(s) (LRB):  VATS (VIDEO-ASSISTED THORACOSCOPIC SURGERY) (Right)    Final Anesthesia Type: general      Patient location during evaluation: PACU  Patient participation: Yes- Able to Participate  Level of consciousness: awake and alert  Post-procedure vital signs: reviewed and stable  Pain management: adequate  Airway patency: patent  MARIANA mitigation strategies: Multimodal analgesia  PONV status at discharge: No PONV  Anesthetic complications: no      Cardiovascular status: blood pressure returned to baseline and hemodynamically stable  Respiratory status: unassisted and spontaneous ventilation  Hydration status: euvolemic  Follow-up not needed.          Vitals Value Taken Time   /66 02/15/23 1300   Temp 36.5 02/15/23 1312   Pulse 67 02/15/23 1311   Resp 8 02/15/23 1311   SpO2 95 % 02/15/23 1311   Vitals shown include unvalidated device data.      No case tracking events are documented in the log.      Pain/Leonila Score: Pain Rating Prior to Med Admin: 6 (2/15/2023 11:47 AM)  Leonila Score: 8 (2/15/2023 12:00 PM)

## 2023-02-15 NOTE — ANESTHESIA PREPROCEDURE EVALUATION
02/15/2023  Eagle Patterson is a 62 y.o., male.      Pre-op Assessment    I have reviewed the Patient Summary Reports.     I have reviewed the Nursing Notes. I have reviewed the NPO Status.   I have reviewed the Medications.     Review of Systems  Anesthesia Hx:  No problems with previous Anesthesia    Social:  Non-Smoker    Hematology/Oncology:         -- Anemia:   Cardiovascular:   Denies Hypertension.  Denies MI.    Denies Angina.  Denies CHF. hyperlipidemia    Pulmonary:   Denies COPD.  Denies Asthma. Sleep Apnea (recent diagnosis)    Renal/:   Chronic Renal Disease, CKD no renal calculi    Hepatic/GI:   GERD, well controlled Denies Liver Disease. Denies Hepatitis. Esophageal CA - radiation   Neurological:   Denies CVA. Denies Seizures.    Endocrine:   Denies Diabetes. Hypothyroidism Taking prednisone Denies Obesity / BMI > 30      Physical Exam  General: Well nourished, Cooperative, Alert and Oriented    Airway:  Mallampati: I   Mouth Opening: Normal  Tongue: Normal  Neck ROM: Normal ROM    Dental:        Anesthesia Plan  Type of Anesthesia, risks & benefits discussed:    Anesthesia Type: Gen ETT  Intra-op Monitoring Plan: Standard ASA Monitors and Art Line  Induction:  IV  Airway Plan: Video  Informed Consent: Informed consent signed with the Patient and all parties understand the risks and agree with anesthesia plan.  All questions answered.   ASA Score: 3  Day of Surgery Review of History & Physical: H&P Update referred to the surgeon/provider.  Anesthesia Plan Notes: Munoz  Stress dose steroid in OR    Ready For Surgery From Anesthesia Perspective.     .

## 2023-02-15 NOTE — ANESTHESIA RELEASE NOTE
"Anesthesia Release from PACU Note    Patient: Eagle Patterson    Procedure(s) Performed: Procedure(s) (LRB):  VATS (VIDEO-ASSISTED THORACOSCOPIC SURGERY) (Right)    Anesthesia type: general    Post pain: Adequate analgesia    Post assessment: no apparent anesthetic complications    Last Vitals:   Visit Vitals  /76   Pulse 71   Temp 36.5 °C (97.7 °F) (Oral)   Resp (!) 9   Ht 5' 10" (1.778 m)   Wt 71.8 kg (158 lb 4.6 oz)   SpO2 95%   BMI 22.71 kg/m²       Post vital signs: stable    Level of consciousness: awake, alert  and oriented    Nausea/Vomiting: no nausea/no vomiting    Complications: none    Airway Patency: patent    Respiratory: unassisted, spontaneous ventilation, room air    Cardiovascular: stable and blood pressure at baseline    Hydration: euvolemic  "

## 2023-02-15 NOTE — CONSULTS
[facility] Services  Pulmonary Critical Care Note    Patient Name: Eagle Patterson  MRN: [id number]  Admission Date: 2/15/2023  Hospital Length of Stay: 0 days  Code Status: No Order  Attending Provider: Sundeep Abrams IV, MD  Primary Care Provider: Chuck Lawrence MD     Subjective:     HPI:   This is a 61 y/o CM who is a patient of Dr. Ellis with history of esophageal carcinoma treated with chemoradiation at [facility] [place], COVID 2021, and apparently some degree of underlying interstitial lung disease.  In the distant past patient was undergoing workup for ILD was incidentally found to have the above esophageal cancer ILD workup was placed on hold so the patient can proceed with cancer treatment.  Patient was sent back to our clinic for further ILD workup autoimmune labs were ordered, V/Q scan and echocardiogram all were negative the patient continued to have increasing cough and dyspnea despite prednisone 20 mg.  They were agreeable to proceed with surgical lung biopsy patient was then referred to Dr. Abrams he was admitted to [facility] on 02/15/2023 underwent right VATS wedge biopsy of upper and lower lobes.  Postoperatively pulmonary is being consulted for further management evaluations.    24 Hour Interval History:  Patient is currently in PACU postprocedure on Ventimask with stable saturation.     Past Medical History:   Diagnosis Date    COVID-19     Esophageal cancer     cT2-3N1/chemo & radiation    HLD (hyperlipidemia)     Hypervitaminosis D 07/28/2022    Microcytic anemia 07/28/2022    On home oxygen therapy     Pure hypercholesterolemia 07/28/2022    Stage 3a chronic kidney disease 07/28/2022    Thyroid disease    DVT    Past Surgical History:   Procedure Laterality Date    BL strabismus surgery  1973    COLONOSCOPY      [place]         Social History     Socioeconomic History    Marital status:    Tobacco Use    Smoking status: Never     Passive  exposure: Past    Smokeless tobacco: Never   Substance and Sexual Activity    Alcohol use: Not Currently    Drug use: Never    Sexual activity: Yes         Current Outpatient Medications   Medication Instructions    albuterol (PROAIR HFA) 90 mcg/actuation inhaler 2 puffs, Inhalation, Every 6 hours PRN, Rescue    aluminum-magnesium hydroxide-simethicone (MAALOX) 200-200-20 mg/5 mL Susp Oral    ARMOUR THYROID 60 mg, Oral, Daily    benzonatate (TESSALON) 100 mg, Oral, 3 times daily PRN    cholecalciferol (vitamin D3) 400 Units, Oral    enoxaparin (LOVENOX) 80 mg, Subcutaneous, Every 12 hours (non-standard times)    esomeprazole (NEXIUM) 40 mg, Oral    hydrocortisone (CORTEF) 10 mg, Oral, 2 times daily    icosapent ethyL (VASCEPA) 1 gram Cap 2 capsules, Oral, 2 times daily    montelukast (SINGULAIR) 10 mg, Oral, Daily    montelukast (SINGULAIR) 10 mg, Oral    NexIUM 40 mg, Oral    omeprazole-sodium bicarbonate (ZEGERID) 40-1.1 mg-gram per capsule 1 capsule, Oral, As needed (PRN)    predniSONE (DELTASONE) 10 mg, Oral, Daily    sulfamethoxazole-trimethoprim 800-160mg (BACTRIM DS) 800-160 mg Tab 1 tablet, Oral    thyroid (pork) (ARMOUR THYROID) 60 mg, Oral       Current Inpatient Medications   ceFAZolin (ANCEF) IVPB  2 g Intravenous Q8H    diphenhydrAMINE        docusate sodium  100 mg Oral BID    enoxaparin  40 mg Subcutaneous Daily    famotidine  20 mg Oral BID    ketorolac  15 mg Intravenous QID    loperamide  4 mg Oral Once    mupirocin  1 g Nasal BID    vancomycin (VANCOCIN) IVPB  1,000 mg Intravenous Q12H       Current Intravenous Infusions   sodium chloride 0.45% 75 mL/hr (02/15/23 1152)       Review of Systems   Reason unable to perform ROS: sedated following surgery.        Objective:       Intake/Output Summary (Last 24 hours) at 2/15/2023 1216  Last data filed at 2/15/2023 0816  Gross per 24 hour   Intake 950 ml   Output --   Net 950 ml         Vital Signs (Most Recent):  Temp: 97.7 °F (36.5 °C) (02/15/23  0531)  Pulse: 71 (02/15/23 1200)  Resp: (!) 9 (02/15/23 1200)  BP: 107/76 (02/15/23 1200)  SpO2: 95 % (02/15/23 1200)    Body mass index is 22.71 kg/m².  Weight: 71.8 kg (158 lb 4.6 oz) Vital Signs (24h Range):  Temp:  [97.7 °F (36.5 °C)] 97.7 °F (36.5 °C)  Pulse:  [] 71  Resp:  [9-22] 9  SpO2:  [86 %-98 %] 95 %  BP: ()/(68-93) 107/76     Physical Exam  Constitutional:       Comments: Cm lying in bed. NAD noted.    HENT:      Head: Normocephalic and atraumatic.   Cardiovascular:      Rate and Rhythm: Normal rate and regular rhythm.      Heart sounds: Normal heart sounds.   Pulmonary:      Effort: Pulmonary effort is normal.      Comments: Posterior crackles; right chest tube noted.   Abdominal:      General: Bowel sounds are normal.      Palpations: Abdomen is soft.   Musculoskeletal:      Right lower leg: No edema.      Left lower leg: No edema.         Lines/Drains/Airways       Drain  Duration                  Chest Tube 02/15/23 0755 Tube - 1 Right Midaxillary 28 Fr. <1 day         Urethral Catheter 02/15/23 0710 Silicone 16 Fr. <1 day              Arterial Line  Duration             Arterial Line 02/15/23 0704 Right Radial <1 day              Peripheral Intravenous Line  Duration                  Peripheral IV - Single Lumen 18 G Left;Posterior Forearm -- days         Peripheral IV - Single Lumen 02/15/23 0711 20 G Right Forearm <1 day                    Significant Labs:    Lab Results   Component Value Date    WBC 4.7 02/15/2023    HGB 6.8 (L) 02/15/2023    HCT 23.5 (L) 02/15/2023    .3 (H) 02/15/2023     (L) 02/15/2023         BMP  Lab Results   Component Value Date     02/14/2023    K 4.9 02/14/2023    CHLORIDE 99 02/14/2023    CO2 29 02/14/2023    BUN 24.6 02/14/2023    CREATININE 1.03 02/14/2023    CALCIUM 10.1 (H) 02/14/2023    EGFRNONAA >60 07/11/2022       ABG  No results for input(s): PH, PO2, PCO2, HCO3, BE in the last 168 hours.    Mechanical Ventilation Support:        Significant Imaging:  X-Ray Chest AP Single View  Narrative: EXAMINATION:  XR CHEST 1 VIEW    CPT 77172    CLINICAL HISTORY:  Post op;    COMPARISON:  February 14, 2023    FINDINGS:  Cardiomediastinal silhouette to be unchanged as compared with the previous exam.    Worsening confluent interstitial markings and airspace opacities identified in the left perihilar region and left base including increased left retrocardiac density and silhouetting of the left hemidiaphragm which might be related to an infiltrate/atelectasis.    Slightly more confluent opacities also identified at the right base.    There has been interval insertion of a right-sided chest tube with a small right apical pneumothorax identified  Impression: Worsening of confluent opacities in the left perihilar region and left lower lobe including increased left retrocardiac density and silhouetting of the left hemidiaphragm.    Slightly more confluent opacities identified at the right lower lobe.    Interval insertion of right-sided chest tube.    Small right apical pneumothorax    Electronically signed by: Adithya Pagan  Date:    02/15/2023  Time:    09:14       Assessment/Plan:     Assessment  Suspected ILD s/p right VATS wedge biopsy of upper and lower lobes.  Anemia   History of esophageal cancer    Plan  Repeat H&H   Continue to await pathology from open lung biopsy   CT MGMT per CV surgery   Patient was on outpatient steroids will need to resume in am      JOSE Hand  Pulmonary Critical Care Medicine  [facility] - Periop Services

## 2023-02-15 NOTE — ANESTHESIA PROCEDURE NOTES
Intubation    Date/Time: 2/15/2023 7:05 AM  Performed by: James Thrasher  Authorized by: Barrington Bolton DO     Intubation:     Induction:  Intravenous    Intubated:  Postinduction    Mask Ventilation:  Easy mask    Attempts:  1    Attempted By:  Student    Method of Intubation:  Video laryngoscopy    Blade:  Munoz 4    Laryngeal View Grade: Grade I - full view of cords      Difficult Airway Encountered?: No      Complications:  None    Airway Device:  Double lumen tube left    Airway Device Size:  37F    Style/Cuff Inflation:  Cuffed (inflated to minimal occlusive pressure)    Tube secured:  29    Secured at:  The lips    Placement Verified By:  Capnometry and Fiber optic visualization    Complicating Factors:  None    Findings Post-Intubation:  BS equal bilateral

## 2023-02-15 NOTE — ANESTHESIA PROCEDURE NOTES
Peripheral IV Insertion    Diagnosis: I99.8 Other disorder of circulatory system    Patient location during procedure: OR  Procedure start time: 2/15/2023 7:11 AM  Timeout: 2/15/2023 7:10 AM  Procedure end time: 2/15/2023 7:16 AM    Staffing  Authorizing Provider: Barrington Bolton DO  Performing Provider: James Thrasher    Anesthesiologist was present at the time of the procedure.    Preanesthetic Checklist  Completed: patient identified, IV checked, site marked, risks and benefits discussed, surgical consent, monitors and equipment checked, pre-op evaluation, timeout performed and anesthesia consent givenPeripheral IV Insertion  Skin Prep: chlorhexidine gluconate  Orientation: right  Location: forearm  Catheter Type: peripheral IV (single lumen)  Catheter Size: 20 G Insertion Attempts: 2  Assessment  Dressing: secured with tape and tegaderm  Patient: Tolerated well  Line flushed easily.

## 2023-02-16 LAB
ANION GAP SERPL CALC-SCNC: 9 MEQ/L
BASOPHILS # BLD AUTO: 0.04 X10(3)/MCL (ref 0–0.2)
BASOPHILS NFR BLD AUTO: 0.4 %
BUN SERPL-MCNC: 16.8 MG/DL (ref 8.4–25.7)
CALCIUM SERPL-MCNC: 8.7 MG/DL (ref 8.8–10)
CHLORIDE SERPL-SCNC: 99 MMOL/L (ref 98–107)
CO2 SERPL-SCNC: 30 MMOL/L (ref 23–31)
CREAT SERPL-MCNC: 0.97 MG/DL (ref 0.73–1.18)
CREAT/UREA NIT SERPL: 17
EOSINOPHIL # BLD AUTO: 0.32 X10(3)/MCL (ref 0–0.9)
EOSINOPHIL NFR BLD AUTO: 3.4 %
ERYTHROCYTE [DISTWIDTH] IN BLOOD BY AUTOMATED COUNT: 19.5 % (ref 11.5–17)
GFR SERPLBLD CREATININE-BSD FMLA CKD-EPI: >60 MLS/MIN/1.73/M2
GLUCOSE SERPL-MCNC: 83 MG/DL (ref 82–115)
GRAM STN SPEC: NORMAL
HCT VFR BLD AUTO: 47.6 % (ref 42–52)
HGB BLD-MCNC: 14.9 GM/DL (ref 14–18)
IMM GRANULOCYTES # BLD AUTO: 0.04 X10(3)/MCL (ref 0–0.04)
IMM GRANULOCYTES NFR BLD AUTO: 0.4 %
LYMPHOCYTES # BLD AUTO: 0.22 X10(3)/MCL (ref 0.6–4.6)
LYMPHOCYTES NFR BLD AUTO: 2.3 %
MCH RBC QN AUTO: 29.3 PG
MCHC RBC AUTO-ENTMCNC: 31.3 MG/DL (ref 33–36)
MCV RBC AUTO: 93.5 FL (ref 80–94)
MONOCYTES # BLD AUTO: 0.72 X10(3)/MCL (ref 0.1–1.3)
MONOCYTES NFR BLD AUTO: 7.6 %
NEUTROPHILS # BLD AUTO: 8.15 X10(3)/MCL (ref 2.1–9.2)
NEUTROPHILS NFR BLD AUTO: 85.9 %
NRBC BLD AUTO-RTO: 0 %
PLATELET # BLD AUTO: 204 X10(3)/MCL (ref 130–400)
PMV BLD AUTO: 9.5 FL (ref 7.4–10.4)
POTASSIUM SERPL-SCNC: 4.2 MMOL/L (ref 3.5–5.1)
RBC # BLD AUTO: 5.09 X10(6)/MCL (ref 4.7–6.1)
SODIUM SERPL-SCNC: 138 MMOL/L (ref 136–145)
WBC # SPEC AUTO: 9.5 X10(3)/MCL (ref 4.5–11.5)

## 2023-02-16 PROCEDURE — 94799 UNLISTED PULMONARY SVC/PX: CPT

## 2023-02-16 PROCEDURE — 63600175 PHARM REV CODE 636 W HCPCS: Performed by: SPECIALIST

## 2023-02-16 PROCEDURE — 99024 PR POST-OP FOLLOW-UP VISIT: ICD-10-PCS | Mod: ,,, | Performed by: PHYSICIAN ASSISTANT

## 2023-02-16 PROCEDURE — 25000242 PHARM REV CODE 250 ALT 637 W/ HCPCS: Performed by: PHYSICIAN ASSISTANT

## 2023-02-16 PROCEDURE — 27000221 HC OXYGEN, UP TO 24 HOURS

## 2023-02-16 PROCEDURE — 99900035 HC TECH TIME PER 15 MIN (STAT)

## 2023-02-16 PROCEDURE — 94640 AIRWAY INHALATION TREATMENT: CPT

## 2023-02-16 PROCEDURE — 80048 BASIC METABOLIC PNL TOTAL CA: CPT | Performed by: SPECIALIST

## 2023-02-16 PROCEDURE — 99024 POSTOP FOLLOW-UP VISIT: CPT | Mod: ,,, | Performed by: PHYSICIAN ASSISTANT

## 2023-02-16 PROCEDURE — 25000003 PHARM REV CODE 250: Performed by: SPECIALIST

## 2023-02-16 PROCEDURE — 25000003 PHARM REV CODE 250: Performed by: PHYSICIAN ASSISTANT

## 2023-02-16 PROCEDURE — 21400001 HC TELEMETRY ROOM

## 2023-02-16 PROCEDURE — 85025 COMPLETE CBC W/AUTO DIFF WBC: CPT | Performed by: SPECIALIST

## 2023-02-16 PROCEDURE — 63600175 PHARM REV CODE 636 W HCPCS: Performed by: INTERNAL MEDICINE

## 2023-02-16 RX ORDER — MORPHINE SULFATE 4 MG/ML
2 INJECTION, SOLUTION INTRAMUSCULAR; INTRAVENOUS EVERY 4 HOURS PRN
Status: DISCONTINUED | OUTPATIENT
Start: 2023-02-16 | End: 2023-02-22 | Stop reason: HOSPADM

## 2023-02-16 RX ORDER — ALBUTEROL SULFATE 90 UG/1
2 AEROSOL, METERED RESPIRATORY (INHALATION) EVERY 6 HOURS PRN
Status: DISCONTINUED | OUTPATIENT
Start: 2023-02-16 | End: 2023-02-22 | Stop reason: HOSPADM

## 2023-02-16 RX ORDER — PREDNISONE 20 MG/1
20 TABLET ORAL 2 TIMES DAILY
Status: DISCONTINUED | OUTPATIENT
Start: 2023-02-16 | End: 2023-02-22 | Stop reason: HOSPADM

## 2023-02-16 RX ADMIN — PREDNISONE 20 MG: 20 TABLET ORAL at 09:02

## 2023-02-16 RX ADMIN — VANCOMYCIN HYDROCHLORIDE 1000 MG: 1 INJECTION, POWDER, LYOPHILIZED, FOR SOLUTION INTRAVENOUS at 08:02

## 2023-02-16 RX ADMIN — OXYCODONE HYDROCHLORIDE 10 MG: 5 TABLET ORAL at 04:02

## 2023-02-16 RX ADMIN — CEFAZOLIN SODIUM 2 G: 2 SOLUTION INTRAVENOUS at 12:02

## 2023-02-16 RX ADMIN — FAMOTIDINE 20 MG: 20 TABLET, FILM COATED ORAL at 08:02

## 2023-02-16 RX ADMIN — OXYCODONE HYDROCHLORIDE 10 MG: 5 TABLET ORAL at 09:02

## 2023-02-16 RX ADMIN — FAMOTIDINE 20 MG: 20 TABLET, FILM COATED ORAL at 09:02

## 2023-02-16 RX ADMIN — KETOROLAC TROMETHAMINE 15 MG: 30 INJECTION, SOLUTION INTRAMUSCULAR; INTRAVENOUS at 05:02

## 2023-02-16 RX ADMIN — MUPIROCIN 1 G: 20 OINTMENT TOPICAL at 09:02

## 2023-02-16 RX ADMIN — OXYCODONE HYDROCHLORIDE 10 MG: 5 TABLET ORAL at 08:02

## 2023-02-16 RX ADMIN — DOCUSATE SODIUM 100 MG: 100 CAPSULE, LIQUID FILLED ORAL at 08:02

## 2023-02-16 RX ADMIN — KETOROLAC TROMETHAMINE 15 MG: 30 INJECTION, SOLUTION INTRAMUSCULAR; INTRAVENOUS at 06:02

## 2023-02-16 RX ADMIN — IPRATROPIUM BROMIDE AND ALBUTEROL SULFATE 3 ML: 2.5; .5 SOLUTION RESPIRATORY (INHALATION) at 07:02

## 2023-02-16 RX ADMIN — IPRATROPIUM BROMIDE AND ALBUTEROL SULFATE 3 ML: 2.5; .5 SOLUTION RESPIRATORY (INHALATION) at 12:02

## 2023-02-16 RX ADMIN — VANCOMYCIN HYDROCHLORIDE 1000 MG: 1 INJECTION, POWDER, LYOPHILIZED, FOR SOLUTION INTRAVENOUS at 09:02

## 2023-02-16 RX ADMIN — BENZONATATE 100 MG: 100 CAPSULE ORAL at 12:02

## 2023-02-16 RX ADMIN — IPRATROPIUM BROMIDE AND ALBUTEROL SULFATE 3 ML: 2.5; .5 SOLUTION RESPIRATORY (INHALATION) at 08:02

## 2023-02-16 RX ADMIN — MORPHINE SULFATE 2 MG: 4 INJECTION INTRAVENOUS at 09:02

## 2023-02-16 RX ADMIN — KETOROLAC TROMETHAMINE 15 MG: 30 INJECTION, SOLUTION INTRAMUSCULAR; INTRAVENOUS at 10:02

## 2023-02-16 RX ADMIN — KETOROLAC TROMETHAMINE 15 MG: 30 INJECTION, SOLUTION INTRAMUSCULAR; INTRAVENOUS at 11:02

## 2023-02-16 RX ADMIN — ENOXAPARIN SODIUM 40 MG: 40 INJECTION SUBCUTANEOUS at 04:02

## 2023-02-16 RX ADMIN — DOCUSATE SODIUM 100 MG: 100 CAPSULE, LIQUID FILLED ORAL at 09:02

## 2023-02-16 RX ADMIN — OXYCODONE HYDROCHLORIDE 10 MG: 5 TABLET ORAL at 12:02

## 2023-02-16 NOTE — NURSING
Nurses Note -- 4 Eyes      2/15/2023   7:42 PM      Skin assessed during: Admit      [x] No Pressure Injuries Present    [x]Prevention Measures Documented      [] Yes- Altered Skin Integrity Present or Discovered   [] LDA Added if Not in Epic (Describe Wound)   [] New Altered Skin Integrity was Present on Admit and Documented in LDA   [] Wound Image Taken    Wound Care Consulted? No    Attending Nurse:  Travis Hicks RN     Second RN/Staff Member:  Samreen RAMIREZ

## 2023-02-16 NOTE — NURSING
On night shift, pt had multiple episodes of coughing spells, hyperventilation, extreme pain, and SOB w/ desaturation. Spoke w/ NP regarding pain medicine. Able to give oxy 5-10mg as well as the sched toradol. Pt stated he never had actual pain relief, that he needed something stronger. Made NP aware and wanted to stay away from dilaudid for now d/t resp depression side effects. This AM when getting pt up and into chair, oxygen dropped to 60% on 3L NC. Applied OM @ 12L and it took about 35-45 mins for pt to come back the normal levels he had been running. Daughter in room aware of situation as well as day shift nurse clarence. W/ any type of exertion pt will desat.

## 2023-02-16 NOTE — PLAN OF CARE
02/16/23 1126   Discharge Assessment   Assessment Type Discharge Planning Assessment   Confirmed/corrected address, phone number and insurance Yes   Confirmed Demographics Correct on Facesheet   Source of Information patient;family   Does patient/caregiver understand observation status Yes   Communicated BRYANT with patient/caregiver Yes;Date not available/Unable to determine   People in Home spouse   Do you expect to return to your current living situation? Yes   Prior to hospitilization cognitive status: Alert/Oriented   Current cognitive status: Alert/Oriented   Equipment Currently Used at Home oxygen   Readmission within 30 days? No   Do you currently have service(s) that help you manage your care at home? No   Do you take prescription medications? Yes   Do you have prescription coverage? Yes   Coverage Blue Cross Blue Shield and Medi Share   Do you have any problems affording any of your prescribed medications? No   Who is going to help you get home at discharge? Family will take home.   How do you get to doctors appointments? car, drives self   Are you on dialysis? No   Do you take coumadin? No   Discharge Plan A Home Health   Discharge Plan B Home with family   Discharge Plan discussed with: Patient;Spouse/sig other   Name(s) and Number(s) Milena Patterson  Spouse   [phone]

## 2023-02-16 NOTE — PROGRESS NOTES
[facility] - Periop Services  Pulmonary Critical Care Note    Patient Name: Eagle Patterson  MRN: [id number]  Admission Date: 2/15/2023  Hospital Length of Stay: 1 days  Code Status: No Order  Attending Provider: Sundeep Abrams IV, MD  Primary Care Provider: Chuck Lawrence MD     Subjective:     HPI:   This is a 61 y/o CM who is a patient of Dr. Ellis with history of esophageal carcinoma treated with chemoradiation at [facility] [place], COVID 2021, and apparently some degree of underlying interstitial lung disease.  In the distant past patient was undergoing workup for ILD was incidentally found to have the above esophageal cancer ILD workup was placed on hold so the patient can proceed with cancer treatment.  Patient was sent back to our clinic for further ILD workup autoimmune labs were ordered, V/Q scan and echocardiogram all were negative the patient continued to have increasing cough and dyspnea despite prednisone 20 mg.  They were agreeable to proceed with surgical lung biopsy patient was then referred to Dr. Abrams he was admitted to [facility] on 02/15/2023 underwent right VATS wedge biopsy of upper and lower lobes.  Postoperatively pulmonary is being consulted for further management evaluations.    24 Hour Interval History:  Per patient's caregiver at bedside, patient had an episode of respiratory distress overnight with desaturation down to 60% on NC 3L/min, he was then placed on oxymask at 12 L/min and given pain med with symptom resolution approximately 30 minutes later. This morning, he states having intermittent right sided chest pain and intermittent dry cough. Currently resting comfortably in the chair on 4L/min O2 via NC.     Past Medical History:   Diagnosis Date    COVID-19     Esophageal cancer     cT2-3N1/chemo & radiation    HLD (hyperlipidemia)     Hypervitaminosis D 07/28/2022    Microcytic anemia 07/28/2022    On home oxygen therapy     Pure  hypercholesterolemia 07/28/2022    Stage 3a chronic kidney disease 07/28/2022    Thyroid disease    DVT    Past Surgical History:   Procedure Laterality Date    BL strabismus surgery  1973    COLONOSCOPY      [place]         Social History     Socioeconomic History    Marital status:    Tobacco Use    Smoking status: Never     Passive exposure: Past    Smokeless tobacco: Never   Substance and Sexual Activity    Alcohol use: Not Currently    Drug use: Never    Sexual activity: Yes         Current Outpatient Medications   Medication Instructions    albuterol (PROAIR HFA) 90 mcg/actuation inhaler 2 puffs, Inhalation, Every 6 hours PRN, Rescue    aluminum-magnesium hydroxide-simethicone (MAALOX) 200-200-20 mg/5 mL Susp Oral    ARMOUR THYROID 60 mg, Oral, Daily    benzonatate (TESSALON) 100 mg, Oral, 3 times daily PRN    cholecalciferol (vitamin D3) 400 Units, Oral    enoxaparin (LOVENOX) 80 mg, Subcutaneous, Every 12 hours (non-standard times)    esomeprazole (NEXIUM) 40 mg, Oral    hydrocortisone (CORTEF) 10 mg, Oral, 2 times daily    icosapent ethyL (VASCEPA) 1 gram Cap 2 capsules, Oral, 2 times daily    montelukast (SINGULAIR) 10 mg, Oral, Daily    montelukast (SINGULAIR) 10 mg, Oral    NexIUM 40 mg, Oral    omeprazole-sodium bicarbonate (ZEGERID) 40-1.1 mg-gram per capsule 1 capsule, Oral, As needed (PRN)    predniSONE (DELTASONE) 10 mg, Oral, Daily    sulfamethoxazole-trimethoprim 800-160mg (BACTRIM DS) 800-160 mg Tab 1 tablet, Oral    thyroid (pork) (ARMOUR THYROID) 60 mg, Oral       Current Inpatient Medications   albuterol-ipratropium  3 mL Nebulization Q6H    docusate sodium  100 mg Oral BID    enoxaparin  40 mg Subcutaneous Daily    famotidine  20 mg Oral BID    ketorolac  15 mg Intravenous QID    loperamide  4 mg Oral Once    mupirocin  1 g Nasal BID    vancomycin (VANCOCIN) IVPB  1,000 mg Intravenous Q12H       Current Intravenous Infusions   sodium chloride 0.45% 75 mL/hr (02/15/23 1152)        Review of Systems   Constitutional:  Negative for chills and fever.   HENT:  Negative for congestion.    Respiratory:  Positive for cough and shortness of breath. Negative for sputum production.    Cardiovascular:  Positive for chest pain. Negative for palpitations.   Neurological:  Positive for weakness.        Objective:       Intake/Output Summary (Last 24 hours) at 2/16/2023 0832  Last data filed at 2/16/2023 0617  Gross per 24 hour   Intake 360 ml   Output 1370 ml   Net -1010 ml           Vital Signs (Most Recent):  Temp: 98.5 °F (36.9 °C) (02/16/23 0736)  Pulse: 96 (02/16/23 0800)  Resp: 20 (02/16/23 0741)  BP: 101/65 (02/16/23 0736)  SpO2: (!) 94 % (02/16/23 0800)    Body mass index is 23.83 kg/m².  Weight: 75.3 kg (166 lb 1.6 oz) Vital Signs (24h Range):  Temp:  [97.9 °F (36.6 °C)-98.5 °F (36.9 °C)] 98.5 °F (36.9 °C)  Pulse:  [] 96  Resp:  [9-24] 20  SpO2:  [93 %-98 %] 94 %  BP: ()/(65-88) 101/65     Physical exam:  General: Well nourished w/ respiratory distress  HEENT: NC/AT; PERRL; nasal and oral mucosa moist and clear  Pulm: Wet crackles in right lower lobe, diminished in left lower lobe, rhonchi in right upper lobe; normal work of breathing on 4L/min via NC; right-sided chest tube patent   CV: S1, S2 w/o murmurs or gallops; no edema noted  GI: Soft with normal bowel sounds in all quadrants, no masses on palpation  Neuro: AAOx4  Psych: Cooperative; appropriate mood and affect    Lines/Drains/Airways       Drain  Duration                  Chest Tube 02/15/23 0756 Tube - 1 Right Midaxillary 28 Fr. 1 day         Urethral Catheter 02/15/23 0710 Silicone 16 Fr. 1 day              Peripheral Intravenous Line  Duration                  Peripheral IV - Single Lumen 18 G Left;Posterior Forearm -- days         Peripheral IV - Single Lumen 02/15/23 0711 20 G Right Forearm 1 day                    Significant Labs:    Lab Results   Component Value Date    WBC 9.5 02/16/2023    HGB 14.9 02/16/2023     HCT 47.6 02/16/2023    MCV 93.5 02/16/2023     02/16/2023         BMP  Lab Results   Component Value Date     02/14/2023    K 4.9 02/14/2023    CHLORIDE 99 02/14/2023    CO2 29 02/14/2023    BUN 24.6 02/14/2023    CREATININE 1.03 02/14/2023    CALCIUM 10.1 (H) 02/14/2023    EGFRNONAA >60 07/11/2022       ABG  No results for input(s): PH, PO2, PCO2, HCO3, BE in the last 168 hours.    Mechanical Ventilation Support:       Significant Imaging:  X-Ray Chest AP Single View  Narrative: EXAMINATION:  XR CHEST 1 VIEW    CLINICAL HISTORY:  Post op;    TECHNIQUE:  Single view of the chest    COMPARISON:  02/15/2023    FINDINGS:  Right-sided thoracostomy tube remains in place.  Trace right apical pneumothorax is unchanged.  Bilateral lower lung zone opacifications slightly improved on the left in the interval.  Impression: As above.    Electronically signed by: Alfredo Willams  Date:    02/16/2023  Time:    07:45       Assessment/Plan:     Assessment  Post-operative respiratory distress   Suspected ILD s/p right VATS wedge biopsy of upper and lower lobes  History of esophageal cancer    Plan  -Continue post-op management per CV surgery team  -Continue incentive spirometer   -Will plan to resume steroid regimen  -May consider getting physical and occupational therapy teams on-board once cleared by CV surgery team  -Pulmonary team will continue to follow     Jenny Boggs DO, PGY-II  Pulmonary Medicine

## 2023-02-17 LAB
ANION GAP SERPL CALC-SCNC: 5 MEQ/L
BUN SERPL-MCNC: 17.6 MG/DL (ref 8.4–25.7)
CALCIUM SERPL-MCNC: 8.8 MG/DL (ref 8.8–10)
CHLORIDE SERPL-SCNC: 103 MMOL/L (ref 98–107)
CO2 SERPL-SCNC: 33 MMOL/L (ref 23–31)
CREAT SERPL-MCNC: 0.8 MG/DL (ref 0.73–1.18)
CREAT/UREA NIT SERPL: 22
GFR SERPLBLD CREATININE-BSD FMLA CKD-EPI: >60 MLS/MIN/1.73/M2
GLUCOSE SERPL-MCNC: 135 MG/DL (ref 82–115)
M AVIUM PARATB TISS QL ZN STN: NORMAL
POTASSIUM SERPL-SCNC: 5.1 MMOL/L (ref 3.5–5.1)
SODIUM SERPL-SCNC: 141 MMOL/L (ref 136–145)

## 2023-02-17 PROCEDURE — 21400001 HC TELEMETRY ROOM

## 2023-02-17 PROCEDURE — 94761 N-INVAS EAR/PLS OXIMETRY MLT: CPT

## 2023-02-17 PROCEDURE — 25000003 PHARM REV CODE 250: Performed by: PHYSICIAN ASSISTANT

## 2023-02-17 PROCEDURE — 80048 BASIC METABOLIC PNL TOTAL CA: CPT | Performed by: SPECIALIST

## 2023-02-17 PROCEDURE — 25000242 PHARM REV CODE 250 ALT 637 W/ HCPCS: Performed by: PHYSICIAN ASSISTANT

## 2023-02-17 PROCEDURE — 63600175 PHARM REV CODE 636 W HCPCS: Performed by: INTERNAL MEDICINE

## 2023-02-17 PROCEDURE — 99900031 HC PATIENT EDUCATION (STAT)

## 2023-02-17 PROCEDURE — 97162 PT EVAL MOD COMPLEX 30 MIN: CPT

## 2023-02-17 PROCEDURE — 63600175 PHARM REV CODE 636 W HCPCS: Performed by: SPECIALIST

## 2023-02-17 PROCEDURE — 27000221 HC OXYGEN, UP TO 24 HOURS

## 2023-02-17 PROCEDURE — 94640 AIRWAY INHALATION TREATMENT: CPT

## 2023-02-17 PROCEDURE — 25000003 PHARM REV CODE 250: Performed by: SPECIALIST

## 2023-02-17 RX ORDER — GUAIFENESIN 100 MG/5ML
200 SOLUTION ORAL EVERY 4 HOURS PRN
Status: DISCONTINUED | OUTPATIENT
Start: 2023-02-17 | End: 2023-02-19

## 2023-02-17 RX ADMIN — ENOXAPARIN SODIUM 40 MG: 40 INJECTION SUBCUTANEOUS at 04:02

## 2023-02-17 RX ADMIN — GUAIFENESIN 200 MG: 200 SOLUTION ORAL at 04:02

## 2023-02-17 RX ADMIN — OXYCODONE HYDROCHLORIDE 10 MG: 5 TABLET ORAL at 04:02

## 2023-02-17 RX ADMIN — PREDNISONE 20 MG: 20 TABLET ORAL at 08:02

## 2023-02-17 RX ADMIN — OXYCODONE HYDROCHLORIDE 10 MG: 5 TABLET ORAL at 10:02

## 2023-02-17 RX ADMIN — FAMOTIDINE 20 MG: 20 TABLET, FILM COATED ORAL at 08:02

## 2023-02-17 RX ADMIN — GUAIFENESIN 200 MG: 200 SOLUTION ORAL at 10:02

## 2023-02-17 RX ADMIN — KETOROLAC TROMETHAMINE 15 MG: 30 INJECTION, SOLUTION INTRAMUSCULAR; INTRAVENOUS at 05:02

## 2023-02-17 RX ADMIN — IPRATROPIUM BROMIDE AND ALBUTEROL SULFATE 3 ML: 2.5; .5 SOLUTION RESPIRATORY (INHALATION) at 07:02

## 2023-02-17 RX ADMIN — DOCUSATE SODIUM 100 MG: 100 CAPSULE, LIQUID FILLED ORAL at 08:02

## 2023-02-17 RX ADMIN — IPRATROPIUM BROMIDE AND ALBUTEROL SULFATE 3 ML: 2.5; .5 SOLUTION RESPIRATORY (INHALATION) at 01:02

## 2023-02-17 RX ADMIN — OXYCODONE HYDROCHLORIDE 10 MG: 5 TABLET ORAL at 08:02

## 2023-02-17 RX ADMIN — IPRATROPIUM BROMIDE AND ALBUTEROL SULFATE 3 ML: 2.5; .5 SOLUTION RESPIRATORY (INHALATION) at 12:02

## 2023-02-17 RX ADMIN — GUAIFENESIN 200 MG: 200 SOLUTION ORAL at 11:02

## 2023-02-17 RX ADMIN — OXYCODONE HYDROCHLORIDE 10 MG: 5 TABLET ORAL at 12:02

## 2023-02-17 RX ADMIN — IPRATROPIUM BROMIDE AND ALBUTEROL SULFATE 3 ML: 2.5; .5 SOLUTION RESPIRATORY (INHALATION) at 08:02

## 2023-02-17 NOTE — PLAN OF CARE
Problem: Physical Therapy  Goal: Physical Therapy Goal  Description: Goals to be met by: 3/17/23     Patient will increase functional independence with mobility by performin. Supine to sit with [place]  2. Sit to supine with [place]  3. Sit to stand transfer with [place]  4. Gait  x 200 feet with [place] using No Assistive Device while maintaining adequate O2 sats.     Outcome: Ongoing, Progressing

## 2023-02-17 NOTE — PROGRESS NOTES
Pulmonary & Critical Care Medicine   Progress Note      Presenting History/HPI:  This is a 61 y/o CM who is a patient of Dr. Ellis with history of esophageal carcinoma treated with chemoradiation at [facility], COVID 2021, and apparently some degree of underlying interstitial lung disease.  In the distant past patient was undergoing workup for ILD was incidentally found to have the above esophageal cancer ILD workup was placed on hold so the patient can proceed with cancer treatment.  Patient was sent back to our clinic for further ILD workup autoimmune labs were ordered, V/Q scan and echocardiogram all were negative the patient continued to have increasing cough and dyspnea despite prednisone 20 mg.  They were agreeable to proceed with surgical lung biopsy patient was then referred to Dr. Abrams he was admitted to [facility] on 02/15/2023 underwent right VATS wedge biopsy of upper and lower lobes.  Postoperatively pulmonary is being consulted for further management evaluations.      Interval History:  -no major issues or changes overnight   -seen ambulating in the halls this morning doing quite well complaining of some degree of shortness of breath and fatigue with ambulation   -currently on 4 L nasal cannula oxygen  -chest tube has been removed      Scheduled Medications:    albuterol-ipratropium  3 mL Nebulization Q6H    docusate sodium  100 mg Oral BID    enoxaparin  40 mg Subcutaneous Daily    famotidine  20 mg Oral BID    loperamide  4 mg Oral Once    predniSONE  20 mg Oral BID       PRN Medications:   acetaminophen, albuterol, benzonatate, guaiFENesin 100 mg/5 ml, melatonin, metoclopramide HCl, morphine, ondansetron, oxyCODONE, oxyCODONE, sodium chloride 0.9%      Infusions:          Fluid Balance:     Intake/Output Summary (Last 24 hours) at 2/17/2023 1226  Last data filed at 2/17/2023 0656  Gross per 24 hour   Intake 1521 ml   Output 2470 ml   Net -949 ml           Vital Signs:   Vitals:     02/17/23 1128   BP: 108/71   Pulse: 107   Resp: 18   Temp: 97.9 °F (36.6 °C)         Physical Exam  Vitals and nursing note reviewed.   Constitutional:       General: He is not in acute distress.     Appearance: Normal appearance. He is not ill-appearing or toxic-appearing.   HENT:      Head: Normocephalic and atraumatic.      Right Ear: External ear normal.      Left Ear: External ear normal.      Nose: Nose normal.      Mouth/Throat:      Mouth: Mucous membranes are moist.      Pharynx: Oropharynx is clear. No oropharyngeal exudate or posterior oropharyngeal erythema.   Eyes:      General: No scleral icterus.     Extraocular Movements: Extraocular movements intact.      Conjunctiva/sclera: Conjunctivae normal.      Pupils: Pupils are equal, round, and reactive to light.   Neck:      Vascular: No carotid bruit.   Cardiovascular:      Rate and Rhythm: Normal rate and regular rhythm.      Pulses: Normal pulses.      Heart sounds: Normal heart sounds. No murmur heard.    No friction rub. No gallop.   Pulmonary:      Effort: Pulmonary effort is normal. No respiratory distress.      Breath sounds: Wheezing, rhonchi and rales present.      Comments: Pleural friction rub with rhonchi and rales predominant in the right lung  Abdominal:      General: Abdomen is flat. Bowel sounds are normal. There is no distension.      Palpations: Abdomen is soft.      Tenderness: There is no abdominal tenderness. There is no guarding or rebound.   Genitourinary:     Comments: deferred  Musculoskeletal:         General: No swelling or deformity. Normal range of motion.      Cervical back: Normal range of motion and neck supple. No rigidity or tenderness.   Lymphadenopathy:      Cervical: No cervical adenopathy.   Skin:     General: Skin is warm and dry.      Capillary Refill: Capillary refill takes less than 2 seconds.      Coloration: Skin is not jaundiced.      Findings: No bruising, lesion or rash.   Neurological:      General: No  focal deficit present.      Mental Status: He is alert.      Sensory: No sensory deficit.      Motor: No weakness.   Psychiatric:         Mood and Affect: Mood normal.       Laboratory Studies:   No results for input(s): PH, PCO2, PO2, HCO3, POCSATURATED, BE in the last 24 hours.  No results for input(s): WBC, RBC, HGB, HCT, PLT, MCV, MCH, MCHC in the last 24 hours.  Recent Labs   Lab 02/17/23  0534   GLUCOSE 135*      K 5.1   CO2 33*   BUN 17.6   CREATININE 0.80         Microbiology Data:   Microbiology Results (last 7 days)       Procedure Component Value Units Date/Time    Anaerobic Culture [[id number]] Collected: 02/15/23 0755    Order Status: Completed Specimen: Tissue from Lung, RLL Updated: 02/17/23 0824     Anaerobe Culture No Anaerobes Isolated    AFB Smear [[id number]] Collected: 02/15/23 0755    Order Status: Completed Specimen: Tissue from Lung, RLL Updated: 02/17/23 0816     AFB Smear No AFB seen (Direct smear only)    Tissue Culture - Aerobic [[id number]] Collected: 02/15/23 0755    Order Status: Completed Specimen: Tissue from Lung, RLL Updated: 02/17/23 0644     CULTURE, TISSUE- AEROBIC ([facility]) No Growth At 24 Hours    Gram Stain [[id number]] Collected: 02/15/23 0755    Order Status: Completed Specimen: Tissue from Lung, RLL Updated: 02/16/23 1220     GRAM STAIN No WBCs, No bacteria seen    Fungal Culture [[id number]] Collected: 02/15/23 0755    Order Status: Sent Specimen: Tissue from Lung, RLL Updated: 02/16/23 0949              Imaging:   X-Ray Chest AP Single View  Narrative: EXAMINATION:  XR CHEST 1 VIEW    CLINICAL HISTORY:  Post op;    TECHNIQUE:  Single view of the chest    COMPARISON:  02/15/2023    FINDINGS:  Right-sided thoracostomy tube remains in place.  Trace right apical pneumothorax is unchanged.  Bilateral lower lung zone opacifications slightly improved on the left in the interval.  Impression: As above.    Electronically signed by: Alfredo  Imer  Date:    02/16/2023  Time:    07:45          Assessment and Plan    Assessment:  -postoperative respiratory distress, acute hypoxemic respiratory failure   -suspected ILD status post right-sided VATS wedge biopsy of upper and lower lobes  -history of esophageal cancer status post radiation and chemotherapy finishing in December of 2022  -history of COVID-19 infection in 2021      Plan:  -supplement oxygen to maintain saturation 94-96%   -continue aggressive PT OT and ambulation as tolerated   -await final pathology from wedge biopsy to confirm the underlying pathologic diagnosis of his interstitial lung disease   -patient did receive radiation and chemotherapy for esophageal cancer with possibility of drug-induced pneumonitis with radiation and chemotherapy to explain his ILD as he did receive steroids at the time after he finished cancer treatment with suspicion of these with some improvement, there is a possibility of chronic COVID lung disease as well?    -chest tube out today per CV surgery, continue to ambulate as tolerated   -will most likely need oxygen to go home with oral go to rehab afterwards   -continue on prednisone 20 mg p.o. b.i.d., follow-up in pulmonology Clinic 1-2 weeks after discharge    Plans for discharge home tomorrow per CV surgery team        Mikhail Roca MD  2/17/2023  Pulmonology/Critical Care

## 2023-02-17 NOTE — PT/OT/SLP EVAL
Physical Therapy Evaluation    Patient Name:  Eagle Patterson   MRN:  [id number]    Recommendations:     Discharge Recommendations:  (pt requesting placement at rehab; would benefit from rehab vs TCU)   Discharge Equipment Recommendations: none   Barriers to discharge:  medical dx; decreased endurance     Assessment:     Eagle Patterson is a 62 y.o. male admitted with a medical diagnosis of s/p R VATS wedge biopsy; post-op respiratory distress. Pt with PMH of esophageal carcinoma. He presents with the following impairments/functional limitations: weakness, impaired endurance, impaired functional mobility, impaired cardiopulmonary response to activity, decreased lower extremity function.    Rehab Prognosis: Good; patient would benefit from acute skilled PT services to address these deficits and reach maximum level of function.    Recent Surgery: Procedure(s) (LRB):  VATS (VIDEO-ASSISTED THORACOSCOPIC SURGERY) (Right) 2 Days Post-Op    Plan:     During this hospitalization, patient to be seen 6 x/week to address the identified rehab impairments via gait training, therapeutic activities, therapeutic exercises and progress toward the following goals:    Plan of Care Expires:  03/17/23    Subjective     Chief Complaint: SOB  Patient/Family Comments/goals: to get stronger and go home  Pain/Comfort:  Pain Rating 1: 0/10    Patients cultural, spiritual, Syn[employer] conflicts given the current situation: no    Living Environment:  Pt lives in a H with his spouse  Prior to admission, patients level of function was independent.    Equipment used at home: oxygen.  DME owned (not currently used): none.    Upon discharge, patient will have assistance from spouse.    Objective:     Communicated with NSG prior to session.  Patient found up in chair with pulse ox (continuous), telemetry, oxygen  upon PT entry to room.    General Precautions: Standard,    Orthopedic Precautions:N/A   Braces: N/A  Respiratory Status: Nasal  cannula, flow 4 L/min  Vitals   At rest   SpO2: 90%    HR: 120       Amb trial 1   SpO2: 77%   HR: 132   RR: 57     Amb trial 2   SpO2: 79%   HR: 133   RR: 45    Exams:  Cognitive Exam:  Patient is oriented to Person, Place, Time, and Situation  RLE and LLE Strength: grossly 4/5    Functional Mobility:  Transfers:     Sit to Stand:  stand by assistance with no AD  Gait: pt ambulated approx 90ft + 110ft without use of an AD and Bev; step through pattern; limited by decreased tolerance to ax  Seated rest break taken btw trials  Increased time in a seated position required for pt to recover from O2 sats dropping    Patient left up in chair (with john-mat) with all lines intact, call button in reach, and spouse and MD present.    GOALS:   Multidisciplinary Problems       Physical Therapy Goals          Problem: Physical Therapy    Goal Priority Disciplines Outcome Goal Variances Interventions   Physical Therapy Goal     PT, PT/OT Ongoing, Progressing     Description: Goals to be met by: 3/17/23     Patient will increase functional independence with mobility by performin. Supine to sit with Many  2. Sit to supine with Many  3. Sit to stand transfer with Many  4. Gait  x 200 feet with Many using No Assistive Device while maintaining adequate O2 sats.                          History:     Past Medical History:   Diagnosis Date    COVID-19     Esophageal cancer     cT2-3N1/chemo & radiation    HLD (hyperlipidemia)     Hypervitaminosis D 2022    Microcytic anemia 2022    On home oxygen therapy     Pure hypercholesterolemia 2022    Stage 3a chronic kidney disease 2022    Thyroid disease        Past Surgical History:   Procedure Laterality Date    BL strabismus surgery      COLONOSCOPY      [place]      VIDEO-ASSISTED THORACOSCOPIC SURGERY (VATS) Right 2/15/2023    Procedure: VATS (VIDEO-ASSISTED THORACOSCOPIC SURGERY);  Surgeon: Sundeep Abrams IV, MD;   Location: [facility];  Service: Cardiothoracic;  Laterality: Right;  Right Vats Lung Biopsy       Time Tracking:     PT Received On: 02/17/23  PT Start Time: 1002     PT Stop Time: 1023  PT Total Time (min): 21 min     Billable Minutes: Evaluation mod      02/17/2023

## 2023-02-17 NOTE — PLAN OF CARE
02/17/23 1238   Post-Acute Status   Post-Acute Authorization Placement   Post-Acute Placement Status Referrals Sent   Coverage Blue Cross Blue Shield and Medi Share   Discharge Plan   Discharge Plan A Rehab   Discharge Plan B Rehab     Patient referred to [facility], will follow up.

## 2023-02-18 LAB — BACTERIA SPEC ANAEROBE CULT: NORMAL

## 2023-02-18 PROCEDURE — 94761 N-INVAS EAR/PLS OXIMETRY MLT: CPT

## 2023-02-18 PROCEDURE — 27000221 HC OXYGEN, UP TO 24 HOURS

## 2023-02-18 PROCEDURE — 99024 PR POST-OP FOLLOW-UP VISIT: ICD-10-PCS | Mod: ,,, | Performed by: PHYSICIAN ASSISTANT

## 2023-02-18 PROCEDURE — 99024 POSTOP FOLLOW-UP VISIT: CPT | Mod: ,,, | Performed by: PHYSICIAN ASSISTANT

## 2023-02-18 PROCEDURE — 97165 OT EVAL LOW COMPLEX 30 MIN: CPT

## 2023-02-18 PROCEDURE — 21400001 HC TELEMETRY ROOM

## 2023-02-18 PROCEDURE — 94640 AIRWAY INHALATION TREATMENT: CPT

## 2023-02-18 PROCEDURE — 99900031 HC PATIENT EDUCATION (STAT)

## 2023-02-18 PROCEDURE — 97166 OT EVAL MOD COMPLEX 45 MIN: CPT

## 2023-02-18 PROCEDURE — 63600175 PHARM REV CODE 636 W HCPCS: Performed by: INTERNAL MEDICINE

## 2023-02-18 PROCEDURE — 25000003 PHARM REV CODE 250: Performed by: SPECIALIST

## 2023-02-18 PROCEDURE — 63600175 PHARM REV CODE 636 W HCPCS: Performed by: SPECIALIST

## 2023-02-18 PROCEDURE — 25000003 PHARM REV CODE 250: Performed by: PHYSICIAN ASSISTANT

## 2023-02-18 PROCEDURE — 97116 GAIT TRAINING THERAPY: CPT | Mod: CQ

## 2023-02-18 PROCEDURE — 25000242 PHARM REV CODE 250 ALT 637 W/ HCPCS: Performed by: PHYSICIAN ASSISTANT

## 2023-02-18 RX ADMIN — IPRATROPIUM BROMIDE AND ALBUTEROL SULFATE 3 ML: 2.5; .5 SOLUTION RESPIRATORY (INHALATION) at 07:02

## 2023-02-18 RX ADMIN — OXYCODONE HYDROCHLORIDE 10 MG: 5 TABLET ORAL at 11:02

## 2023-02-18 RX ADMIN — GUAIFENESIN 200 MG: 200 SOLUTION ORAL at 02:02

## 2023-02-18 RX ADMIN — PREDNISONE 20 MG: 20 TABLET ORAL at 09:02

## 2023-02-18 RX ADMIN — ENOXAPARIN SODIUM 40 MG: 40 INJECTION SUBCUTANEOUS at 04:02

## 2023-02-18 RX ADMIN — OXYCODONE HYDROCHLORIDE 10 MG: 5 TABLET ORAL at 02:02

## 2023-02-18 RX ADMIN — FAMOTIDINE 20 MG: 20 TABLET, FILM COATED ORAL at 08:02

## 2023-02-18 RX ADMIN — OXYCODONE HYDROCHLORIDE 10 MG: 5 TABLET ORAL at 03:02

## 2023-02-18 RX ADMIN — IPRATROPIUM BROMIDE AND ALBUTEROL SULFATE 3 ML: 2.5; .5 SOLUTION RESPIRATORY (INHALATION) at 01:02

## 2023-02-18 RX ADMIN — FAMOTIDINE 20 MG: 20 TABLET, FILM COATED ORAL at 09:02

## 2023-02-18 RX ADMIN — OXYCODONE HYDROCHLORIDE 10 MG: 5 TABLET ORAL at 06:02

## 2023-02-18 RX ADMIN — DOCUSATE SODIUM 100 MG: 100 CAPSULE, LIQUID FILLED ORAL at 09:02

## 2023-02-18 RX ADMIN — PREDNISONE 20 MG: 20 TABLET ORAL at 08:02

## 2023-02-18 RX ADMIN — GUAIFENESIN 200 MG: 200 SOLUTION ORAL at 09:02

## 2023-02-18 RX ADMIN — OXYCODONE HYDROCHLORIDE 5 MG: 5 TABLET ORAL at 11:02

## 2023-02-18 RX ADMIN — DOCUSATE SODIUM 100 MG: 100 CAPSULE, LIQUID FILLED ORAL at 08:02

## 2023-02-18 NOTE — PROGRESS NOTES
"Eagle Patterson is a 62 y.o. male patient.   1. Lung mass    2. 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  Scheduled Meds:   albuterol-ipratropium  3 mL Nebulization Q6H    docusate sodium  100 mg Oral BID    enoxaparin  40 mg Subcutaneous Daily    famotidine  20 mg Oral BID    loperamide  4 mg Oral Once    predniSONE  20 mg Oral BID     Continuous Infusions:  PRN Meds:acetaminophen, albuterol, benzonatate, guaiFENesin 100 mg/5 ml, melatonin, metoclopramide HCl, morphine, ondansetron, oxyCODONE, oxyCODONE, sodium chloride 0.9%    Review of patient's allergies indicates:  No Known Allergies  There are no hospital problems to display for this patient.    Blood pressure 112/71, pulse 106, temperature 97.7 °F (36.5 °C), resp. rate 18, height 5' 10" (1.778 m), weight 74.9 kg (165 lb 2 oz), SpO2 (!) 91 %.    Subjective:    POD #3  Awake. Alert.  Sitting up in chair  Desats into 70s with activity    Objective:   AFVSS. 90% on 4L NC  Heart: RRR  Lungs: diminished  Incision: dressed, dry    Assesment/Plan:    S/p: R VATS wedge biopsy of upper and lower lobes  Await final path  Activity as tolerated  Rehab pending            LYN Diego  2/18/2023    "

## 2023-02-18 NOTE — PLAN OF CARE
Problem: Adult Inpatient Plan of Care  Goal: Plan of Care Review  Outcome: Ongoing, Progressing     Problem: Adult Inpatient Plan of Care  Goal: Patient-Specific Goal (Individualized)  Outcome: Ongoing, Progressing     Problem: Adult Inpatient Plan of Care  Goal: Absence of Hospital-Acquired Illness or Injury  Outcome: Ongoing, Progressing     Problem: Adult Inpatient Plan of Care  Goal: Optimal Comfort and Wellbeing  Outcome: Ongoing, Progressing     Problem: Adult Inpatient Plan of Care  Goal: Readiness for Transition of Care  Outcome: Ongoing, Progressing     Problem: Fall Injury Risk  Goal: Absence of Fall and Fall-Related Injury  Outcome: Ongoing, Progressing     Problem: Skin Injury Risk Increased  Goal: Skin Health and Integrity  Outcome: Ongoing, Progressing     Problem: Gas Exchange Impaired  Goal: Optimal Gas Exchange  Outcome: Ongoing, Progressing

## 2023-02-18 NOTE — PLAN OF CARE
Problem: Occupational Therapy  Goal: Occupational Therapy Goal  Description: Goals to be met by: 3/10/2023     Patient will increase functional independence with ADLs by performing:    UE Dressing with Modified [place].  LE Dressing with Modified [place].  Grooming while standing at sink with Modified [place].  Toileting from toilet with Modified [place] for hygiene and clothing management.   Toilet transfer to toilet with Modified [place].  Increased functional strength to WFL for improved participation in ADLs and IADLs.    Outcome: Ongoing, Progressing

## 2023-02-18 NOTE — PROGRESS NOTES
Pulmonary & Critical Care Medicine   Progress Note      Presenting History/HPI:  This is a 63 y/o CM who is a patient of Dr. Ellis with history of esophageal carcinoma treated with chemoradiation at [facility], COVID 2021, and apparently some degree of underlying interstitial lung disease.  In the distant past patient was undergoing workup for ILD was incidentally found to have the above esophageal cancer ILD workup was placed on hold so the patient can proceed with cancer treatment.  Patient was sent back to our clinic for further ILD workup autoimmune labs were ordered, V/Q scan and echocardiogram all were negative the patient continued to have increasing cough and dyspnea despite prednisone 20 mg.  They were agreeable to proceed with surgical lung biopsy patient was then referred to Dr. Abrams he was admitted to [facility] on 02/15/2023 underwent right VATS wedge biopsy of upper and lower lobes.  Postoperatively pulmonary is being consulted for further management evaluations.      Interval History:  -no major issues or changes overnight   -currently on 4 L nasal cannula oxygen  -chest tube has been removed  -Sounds like they are trying to go to rehab post hospitalization       Scheduled Medications:    albuterol-ipratropium  3 mL Nebulization Q6H    docusate sodium  100 mg Oral BID    enoxaparin  40 mg Subcutaneous Daily    famotidine  20 mg Oral BID    loperamide  4 mg Oral Once    predniSONE  20 mg Oral BID       PRN Medications:   acetaminophen, albuterol, benzonatate, guaiFENesin 100 mg/5 ml, melatonin, metoclopramide HCl, morphine, ondansetron, oxyCODONE, oxyCODONE, sodium chloride 0.9%      Infusions:          Fluid Balance:     Intake/Output Summary (Last 24 hours) at 2/18/2023 1132  Last data filed at 2/18/2023 0646  Gross per 24 hour   Intake 900 ml   Output --   Net 900 ml         Vital Signs:   Vitals:    02/18/23 1127   BP: 103/65   Pulse: (!) 125   Resp:    Temp: 98.4 °F (36.9 °C)          Physical Exam  Vitals and nursing note reviewed.   Constitutional:       General: He is not in acute distress.     Appearance: Normal appearance. He is not ill-appearing or toxic-appearing.   HENT:      Head: Normocephalic and atraumatic.      Right Ear: External ear normal.      Left Ear: External ear normal.      Nose: Nose normal.      Mouth/Throat:      Mouth: Mucous membranes are moist.      Pharynx: Oropharynx is clear. No oropharyngeal exudate or posterior oropharyngeal erythema.   Eyes:      General: No scleral icterus.     Extraocular Movements: Extraocular movements intact.      Conjunctiva/sclera: Conjunctivae normal.      Pupils: Pupils are equal, round, and reactive to light.   Neck:      Vascular: No carotid bruit.   Cardiovascular:      Rate and Rhythm: Normal rate and regular rhythm.      Pulses: Normal pulses.      Heart sounds: Normal heart sounds. No murmur heard.    No friction rub. No gallop.   Pulmonary:      Effort: Pulmonary effort is normal. No respiratory distress.      Breath sounds: Rhonchi present.      Comments: Pleural friction rub with rhonchi and rales predominant in the right lung  Abdominal:      General: Abdomen is flat. Bowel sounds are normal. There is no distension.      Palpations: Abdomen is soft.      Tenderness: There is no abdominal tenderness. There is no guarding or rebound.   Genitourinary:     Comments: deferred  Musculoskeletal:         General: No swelling or deformity. Normal range of motion.      Cervical back: Normal range of motion and neck supple. No rigidity or tenderness.   Lymphadenopathy:      Cervical: No cervical adenopathy.   Skin:     General: Skin is warm and dry.      Capillary Refill: Capillary refill takes less than 2 seconds.      Coloration: Skin is not jaundiced.      Findings: No bruising, lesion or rash.   Neurological:      General: No focal deficit present.      Mental Status: He is alert.      Sensory: No sensory deficit.      Motor:  No weakness.   Psychiatric:         Mood and Affect: Mood normal.       Laboratory Studies:   No results for input(s): PH, PCO2, PO2, HCO3, POCSATURATED, BE in the last 24 hours.  No results for input(s): WBC, RBC, HGB, HCT, PLT, MCV, MCH, MCHC in the last 24 hours.  No results for input(s): GLUCOSE, NA, K, CL, CO2, BUN, CREATININE, MG in the last 24 hours.    Invalid input(s):  CALCIUM        Microbiology Data:   Microbiology Results (last 7 days)       Procedure Component Value Units Date/Time    Anaerobic Culture [[id number]] Collected: 02/15/23 0755    Order Status: Completed Specimen: Tissue from Lung, RLL Updated: 02/18/23 0751     Anaerobe Culture No Anaerobes Isolated    Tissue Culture - Aerobic [[id number]] Collected: 02/15/23 0755    Order Status: Completed Specimen: Tissue from Lung, RLL Updated: 02/18/23 0751     CULTURE, TISSUE- AEROBIC ([facility]) No Growth At 48 Hours    AFB Smear [[id number]] Collected: 02/15/23 0755    Order Status: Completed Specimen: Tissue from Lung, RLL Updated: 02/17/23 0816     AFB Smear No AFB seen (Direct smear only)    Gram Stain [[id number]] Collected: 02/15/23 0755    Order Status: Completed Specimen: Tissue from Lung, RLL Updated: 02/16/23 1220     GRAM STAIN No WBCs, No bacteria seen    Fungal Culture [[id number]] Collected: 02/15/23 0755    Order Status: Sent Specimen: Tissue from Lung, RLL Updated: 02/16/23 0949              Imaging:   X-Ray Chest PA And Lateral  Narrative: EXAMINATION:  XR CHEST PA AND LATERAL    CLINICAL HISTORY:  ct removal;    TECHNIQUE:  Two views of the chest    COMPARISON:  02/17/2023    FINDINGS:  Trace right apical pneumothorax remains.  Interval removal of right-sided thoracostomy tube.  Prominent interstitial markings of bilateral lower lobes unchanged.  Impression: Trace right apical pneumothorax remains.  Interval removal of right-sided thoracostomy tube. Prominent interstitial markings of bilateral lower lobes unchanged.    Electronically signed  by: Alfredo Willams  Date:    02/18/2023  Time:    08:40      Assessment and Plan    Assessment:  -postoperative respiratory distress, acute hypoxemic respiratory failure   -suspected ILD status post right-sided VATS wedge biopsy of upper and lower lobes  -history of esophageal cancer status post radiation and chemotherapy finishing in December of 2022  -history of COVID-19 infection in 2021    Plan:  -supplement oxygen to maintain saturation 94-96%   -continue aggressive PT OT and ambulation as tolerated   -await final pathology from wedge biopsy to confirm the underlying pathologic diagnosis of his interstitial lung disease   -patient did receive radiation and chemotherapy for esophageal cancer with possibility of drug-induced pneumonitis with radiation and chemotherapy to explain his ILD as he did receive steroids at the time after he finished cancer treatment with suspicion of these with some improvement, there is a possibility of chronic COVID lung disease as well?    -will most likely need oxygen to go home  -family requesting discharge to rehab afterwards   -continue on prednisone 20 mg p.o. b.i.d., follow-up in pulmonology Clinic 1-2 weeks after discharge        JOSE Hand  2/18/2023  Pulmonology/Critical Care

## 2023-02-18 NOTE — PT/OT/SLP EVAL
Occupational Therapy   Evaluation    Name: Eagle Patterson  MRN: [id number]  Admitting Diagnosis: s/p R VATS wedge biopsy, post-op respiratory distress, esophageal carcinoma  Recent Surgery: Procedure(s) (LRB):  VATS (VIDEO-ASSISTED THORACOSCOPIC SURGERY) (Right) 3 Days Post-Op    Recommendations:     Discharge Recommendations: rehabilitation facility  Discharge Equipment Recommendations:  shower chair (Pending progress)  Barriers to discharge:  Other (Comment) (severity of functional deficits)    Assessment:     Eagle Patterson is a 62 y.o. male with medical diagnoses of s/p R VATS wedge biopsy, post-op respiratory distress.  He presents with the following performance deficits affecting function: weakness, impaired endurance, impaired self care skills, impaired functional mobility, decreased upper extremity function, decreased lower extremity function, impaired fine motor, impaired cardiopulmonary response to activity. Today's evaluation was limited to the bedside chair due to significant desaturations with upper extremity activity, and difficulty recovering with pursed lip breathing. RN aware. Although pt's participation in today's evaluation was somewhat limited, he would benefit greatly from inpatient rehab placement in order to maximize his functional independence and improve his overall safety.     Rehab Prognosis: Good; patient would benefit from acute skilled OT services to address these deficits and reach maximum level of function.       Plan:     Patient to be seen 5 x/week, daily to address the above listed problems via self-care/home management, therapeutic activities, therapeutic exercises  Plan of Care Expires: 03/10/23  Plan of Care Reviewed with: patient, spouse    Subjective     Chief Complaint: Shortness of breath  Patient/Family Comments/goals: To return to prior level of function    Occupational Profile:  Living Environment: Pt reports living in a [facility] with his spouse. He has a tub shower  and a walk in shower.   Previous level of function: Independent with ADLs and IADLs. Wife reports recent decline over the past month with participation in IADLs.  Roles and Routines: Basic self-care tasks, retired, drives, and gardens.   Equipment Used at Home: none  Assistance upon Discharge: Wife    Pain/Comfort:  Pain Rating 1: 0/10    Patients cultural, spiritual, Moravian conflicts given the current situation: no    Objective:     Communicated with: RN prior to session.  Patient found up in chair with oxygen, pulse ox (continuous), telemetry upon OT entry to room.    General Precautions: Standard, fall  Orthopedic Precautions: N/A  Braces: N/A  Respiratory Status: Nasal cannula, flow 4 L/min    Vitals:  Pre-activity- HR: 98; O2 Sats: 90%  During activity- HR: 120; O2 Sats: 77% (Improved to 84% with pursed lip breathing; shortness of breath noted. RN aware and planning to address.)    Occupational Performance:    Functional Mobility/Transfers:  Functional Mobility: No functional mobility or transfers were attempted today due to frequent desaturations.    Activities of Daily Living:  Feeding:  modified independence -increased time required for opening containers and packaging.     Cognitive/Visual Perceptual:  Cognitive/Psychosocial Skills:     -       Oriented to: Person, Place, Time, and Situation   -       Follows Commands/attention:Follows multistep  commands  -       Safety awareness/insight to disability: intact     Physical Exam:  Upper Extremity Range of Motion:     -       Right Upper Extremity: WFL except shoulder flexion limited to ~100 degrees  -       Left Upper Extremity: WFL except shoulder flexion limited to ~100 degrees  Upper Extremity Strength:    -       Right Upper Extremity: Grossly 3/5 MMT  -       Left Upper Extremity: Grossly 3/5 MMT  Fine Motor Coordination:    -       Impaired  Left hand thumb/finger opposition skills (increased time required), Right hand thumb/finger opposition  skills (increased time required), Left hand, manipulation of objects (increased time required), and Right hand, manipulation of objects (increased time required)      Education:  - Educated pt and his spouse on the role of OT, and discussed his POC and goals. They verbalized good understanding and agreement.     Patient left up in chair with all lines intact, call button in reach, and RN notified    GOALS:   Multidisciplinary Problems       Occupational Therapy Goals          Problem: Occupational Therapy    Goal Priority Disciplines Outcome Interventions   Occupational Therapy Goal     OT, PT/OT Ongoing, Progressing    Description: Goals to be met by: 3/10/2023     Patient will increase functional independence with ADLs by performing:    UE Dressing with Modified [place].  LE Dressing with Modified [place].  Grooming while standing at sink with Modified [place].  Toileting from toilet with Modified [place] for hygiene and clothing management.   Toilet transfer to toilet with Modified [place].  Increased functional strength to WFL for improved participation in ADLs and IADLs.                         History:     Past Medical History:   Diagnosis Date    COVID-19     Esophageal cancer     cT2-3N1/chemo & radiation    HLD (hyperlipidemia)     Hypervitaminosis D 07/28/2022    Microcytic anemia 07/28/2022    On home oxygen therapy     Pure hypercholesterolemia 07/28/2022    Stage 3a chronic kidney disease 07/28/2022    Thyroid disease          Past Surgical History:   Procedure Laterality Date    BL strabismus surgery  1973    COLONOSCOPY      [place]      VIDEO-ASSISTED THORACOSCOPIC SURGERY (VATS) Right 2/15/2023    Procedure: VATS (VIDEO-ASSISTED THORACOSCOPIC SURGERY);  Surgeon: Sundeep Abrams IV, MD;  Location: [facility];  Service: Cardiothoracic;  Laterality: Right;  Right Vats Lung Biopsy       Time Tracking:     OT Date of Treatment: 02/18/23  OT Start Time: 0923  OT Stop Time:  0939  OT Total Time (min): 16 min    Billable Minutes:Evaluation 16 min    2/18/2023

## 2023-02-18 NOTE — PT/OT/SLP PROGRESS
Physical Therapy Treatment    Patient Name:  Eagle Patterson   MRN:  [id number]    Recommendations:     Discharge Recommendations:  (pt requesting placement at rehab; would benefit from rehab vs TCU)  Discharge Equipment Recommendations: none  Barriers to discharge:  placement    Assessment:     Eagle Patterson is a 62 y.o. male admitted with a medical diagnosis of s/p R VATS wedge biopsy; post-op respiratory distress. Pt with PMH of esophageal carcinoma..  He presents with the following impairments/functional limitations: impaired endurance, impaired sensation, impaired cardiopulmonary response to activity .  Pt abebe tx well once RT increased O2 to  10L.    Rehab Prognosis: Good and Fair; patient would benefit from acute skilled PT services to address these deficits and reach maximum level of function.    Recent Surgery: Procedure(s) (LRB):  VATS (VIDEO-ASSISTED THORACOSCOPIC SURGERY) (Right) 3 Days Post-Op    Plan:     During this hospitalization, patient to be seen 6 x/week to address the identified rehab impairments via gait training, therapeutic activities, therapeutic exercises and progress toward the following goals:    Plan of Care Expires:  03/17/23    Subjective     Chief Complaint: SOB with activity  Patient/Family Comments/goals: get better  Pain/Comfort:         Objective:     Communicated with RN prior to session.  Patient found up in chair with oxygen upon PT entry to room.     General Precautions: Standard,    Orthopedic Precautions: N/A  Braces: N/A  Respiratory Status:  oxymask 10L with activity  O2 with activty: 79-88%  Skin Integrity: Visible skin intact      Functional Mobility:  Transfers:  Sit to Stand:  independence with no AD  Gait: Pt amb 400ft cga no AD. Brief standing rest breaks needed d/t SOB and O2 desat. Vcs for breathing techniques.  Balance: SBA    Therapeutic Activities/Exercises:  Performed 7reps of sit<>stands from Recliner chair.    Education:  Patient provided with  verbal education regarding POC.  Understanding was verbalized.     Patient left up in chair with all lines intact, call button in reach, RN notified, and FAMILY present..    GOALS:   Multidisciplinary Problems       Physical Therapy Goals          Problem: Physical Therapy    Goal Priority Disciplines Outcome Goal Variances Interventions   Physical Therapy Goal     PT, PT/OT Ongoing, Progressing     Description: Goals to be met by: 3/17/23     Patient will increase functional independence with mobility by performin. Supine to sit with [place]  2. Sit to supine with [place]  3. Sit to stand transfer with [place]  4. Gait  x 200 feet with [place] using No Assistive Device while maintaining adequate O2 sats.                          Time Tracking:     PT Received On: 23  PT Start Time: 1510     PT Stop Time: 1528  PT Total Time (min): 18 min     Billable Minutes: Gait Training 18    Treatment Type: Treatment  PT/PTA: PTA     PTA Visit Number: 1     2023

## 2023-02-19 PROCEDURE — 25000003 PHARM REV CODE 250: Performed by: SPECIALIST

## 2023-02-19 PROCEDURE — 63600175 PHARM REV CODE 636 W HCPCS: Performed by: SPECIALIST

## 2023-02-19 PROCEDURE — 27100171 HC OXYGEN HIGH FLOW UP TO 24 HOURS

## 2023-02-19 PROCEDURE — 99024 POSTOP FOLLOW-UP VISIT: CPT | Mod: ,,, | Performed by: PHYSICIAN ASSISTANT

## 2023-02-19 PROCEDURE — 94761 N-INVAS EAR/PLS OXIMETRY MLT: CPT

## 2023-02-19 PROCEDURE — 94799 UNLISTED PULMONARY SVC/PX: CPT

## 2023-02-19 PROCEDURE — 99900031 HC PATIENT EDUCATION (STAT)

## 2023-02-19 PROCEDURE — 21400001 HC TELEMETRY ROOM

## 2023-02-19 PROCEDURE — 25000003 PHARM REV CODE 250: Performed by: INTERNAL MEDICINE

## 2023-02-19 PROCEDURE — 25000003 PHARM REV CODE 250: Performed by: PHYSICIAN ASSISTANT

## 2023-02-19 PROCEDURE — 99024 PR POST-OP FOLLOW-UP VISIT: ICD-10-PCS | Mod: ,,, | Performed by: PHYSICIAN ASSISTANT

## 2023-02-19 PROCEDURE — 63600175 PHARM REV CODE 636 W HCPCS: Performed by: INTERNAL MEDICINE

## 2023-02-19 PROCEDURE — 94640 AIRWAY INHALATION TREATMENT: CPT

## 2023-02-19 PROCEDURE — 27000221 HC OXYGEN, UP TO 24 HOURS

## 2023-02-19 PROCEDURE — 25000242 PHARM REV CODE 250 ALT 637 W/ HCPCS: Performed by: PHYSICIAN ASSISTANT

## 2023-02-19 RX ORDER — GUAIFENESIN 600 MG/1
600 TABLET, EXTENDED RELEASE ORAL 2 TIMES DAILY
Status: DISCONTINUED | OUTPATIENT
Start: 2023-02-19 | End: 2023-02-19

## 2023-02-19 RX ORDER — GUAIFENESIN 600 MG/1
600 TABLET, EXTENDED RELEASE ORAL 2 TIMES DAILY
Status: DISCONTINUED | OUTPATIENT
Start: 2023-02-19 | End: 2023-02-22 | Stop reason: HOSPADM

## 2023-02-19 RX ORDER — ALPRAZOLAM 0.25 MG/1
0.25 TABLET ORAL 3 TIMES DAILY PRN
Status: DISCONTINUED | OUTPATIENT
Start: 2023-02-19 | End: 2023-02-22 | Stop reason: HOSPADM

## 2023-02-19 RX ADMIN — FAMOTIDINE 20 MG: 20 TABLET, FILM COATED ORAL at 08:02

## 2023-02-19 RX ADMIN — GUAIFENESIN 600 MG: 600 TABLET, EXTENDED RELEASE ORAL at 09:02

## 2023-02-19 RX ADMIN — PREDNISONE 20 MG: 20 TABLET ORAL at 09:02

## 2023-02-19 RX ADMIN — DOCUSATE SODIUM 100 MG: 100 CAPSULE, LIQUID FILLED ORAL at 08:02

## 2023-02-19 RX ADMIN — OXYCODONE HYDROCHLORIDE 10 MG: 5 TABLET ORAL at 03:02

## 2023-02-19 RX ADMIN — PREDNISONE 20 MG: 20 TABLET ORAL at 08:02

## 2023-02-19 RX ADMIN — ALPRAZOLAM 0.25 MG: 0.25 TABLET ORAL at 07:02

## 2023-02-19 RX ADMIN — OXYCODONE HYDROCHLORIDE 10 MG: 5 TABLET ORAL at 09:02

## 2023-02-19 RX ADMIN — BENZONATATE 100 MG: 100 CAPSULE ORAL at 12:02

## 2023-02-19 RX ADMIN — FAMOTIDINE 20 MG: 20 TABLET, FILM COATED ORAL at 09:02

## 2023-02-19 RX ADMIN — IPRATROPIUM BROMIDE AND ALBUTEROL SULFATE 3 ML: 2.5; .5 SOLUTION RESPIRATORY (INHALATION) at 02:02

## 2023-02-19 RX ADMIN — OXYCODONE HYDROCHLORIDE 10 MG: 5 TABLET ORAL at 12:02

## 2023-02-19 RX ADMIN — OXYCODONE HYDROCHLORIDE 10 MG: 5 TABLET ORAL at 05:02

## 2023-02-19 RX ADMIN — OXYCODONE HYDROCHLORIDE 10 MG: 5 TABLET ORAL at 08:02

## 2023-02-19 RX ADMIN — DOCUSATE SODIUM 100 MG: 100 CAPSULE, LIQUID FILLED ORAL at 09:02

## 2023-02-19 RX ADMIN — BENZONATATE 100 MG: 100 CAPSULE ORAL at 09:02

## 2023-02-19 RX ADMIN — ENOXAPARIN SODIUM 40 MG: 40 INJECTION SUBCUTANEOUS at 05:02

## 2023-02-19 RX ADMIN — IPRATROPIUM BROMIDE AND ALBUTEROL SULFATE 3 ML: 2.5; .5 SOLUTION RESPIRATORY (INHALATION) at 08:02

## 2023-02-19 RX ADMIN — IPRATROPIUM BROMIDE AND ALBUTEROL SULFATE 3 ML: 2.5; .5 SOLUTION RESPIRATORY (INHALATION) at 07:02

## 2023-02-19 NOTE — PROGRESS NOTES
Pulmonary & Critical Care Medicine   Progress Note      Presenting History/HPI:  This is a 61 y/o CM who is a patient of Dr. Ellis with history of esophageal carcinoma treated with chemoradiation at [facility], COVID 2021, and apparently some degree of underlying interstitial lung disease.  In the distant past patient was undergoing workup for ILD was incidentally found to have the above esophageal cancer ILD workup was placed on hold so the patient can proceed with cancer treatment.  Patient was sent back to our clinic for further ILD workup autoimmune labs were ordered, V/Q scan and echocardiogram all were negative the patient continued to have increasing cough and dyspnea despite prednisone 20 mg.  They were agreeable to proceed with surgical lung biopsy patient was then referred to Dr. Abrams he was admitted to [facility] on 02/15/2023 underwent right VATS wedge biopsy of upper and lower lobes.  Postoperatively pulmonary was consulted for further management evaluations.      Interval History:  -no major issues or changes overnight   -currently on Vapotherm 25 L/100% in attempts to prevent atelectasis.   -Sounds like they are trying to go to rehab post hospitalization   - reporting sats are dropping in the upper 60s with movement.     Scheduled Medications:    albuterol-ipratropium  3 mL Nebulization Q6H    docusate sodium  100 mg Oral BID    enoxaparin  40 mg Subcutaneous Daily    famotidine  20 mg Oral BID    loperamide  4 mg Oral Once    predniSONE  20 mg Oral BID       PRN Medications:   acetaminophen, albuterol, benzonatate, guaiFENesin 100 mg/5 ml, melatonin, metoclopramide HCl, morphine, ondansetron, oxyCODONE, oxyCODONE, sodium chloride 0.9%        Fluid Balance:     Intake/Output Summary (Last 24 hours) at 2/19/2023 1115  Last data filed at 2/19/2023 0635  Gross per 24 hour   Intake 2110 ml   Output --   Net 2110 ml         Vital Signs:   Vitals:    02/19/23 1000   BP:    Pulse: (!)  129   Resp: (!) 28   Temp:          Physical Exam  Vitals and nursing note reviewed.   Constitutional:       General: He is not in acute distress.     Appearance: Normal appearance. He is not ill-appearing or toxic-appearing.   HENT:      Head: Normocephalic and atraumatic.      Right Ear: External ear normal.      Left Ear: External ear normal.      Nose: Nose normal.      Mouth/Throat:      Mouth: Mucous membranes are moist.      Pharynx: Oropharynx is clear. No oropharyngeal exudate or posterior oropharyngeal erythema.   Eyes:      General: No scleral icterus.     Extraocular Movements: Extraocular movements intact.      Conjunctiva/sclera: Conjunctivae normal.      Pupils: Pupils are equal, round, and reactive to light.   Neck:      Vascular: No carotid bruit.   Cardiovascular:      Rate and Rhythm: Normal rate and regular rhythm.      Pulses: Normal pulses.      Heart sounds: Normal heart sounds. No murmur heard.    No friction rub. No gallop.   Pulmonary:      Effort: Pulmonary effort is normal. No respiratory distress.      Comments: Scattered crackles heard   Abdominal:      General: Abdomen is flat. Bowel sounds are normal. There is no distension.      Palpations: Abdomen is soft.      Tenderness: There is no abdominal tenderness. There is no guarding or rebound.   Genitourinary:     Comments: deferred  Musculoskeletal:         General: No swelling or deformity. Normal range of motion.      Cervical back: Normal range of motion and neck supple. No rigidity or tenderness.   Lymphadenopathy:      Cervical: No cervical adenopathy.   Skin:     General: Skin is warm and dry.      Capillary Refill: Capillary refill takes less than 2 seconds.      Coloration: Skin is not jaundiced.      Findings: No bruising, lesion or rash.   Neurological:      General: No focal deficit present.      Mental Status: He is alert.      Sensory: No sensory deficit.      Motor: No weakness.   Psychiatric:         Mood and Affect: Mood  normal.       Laboratory Studies:   No results for input(s): PH, PCO2, PO2, HCO3, POCSATURATED, BE in the last 24 hours.  No results for input(s): WBC, RBC, HGB, HCT, PLT, MCV, MCH, MCHC in the last 24 hours.  No results for input(s): GLUCOSE, NA, K, CL, CO2, BUN, CREATININE, MG in the last 24 hours.    Invalid input(s):  CALCIUM      Microbiology Data:   Microbiology Results (last 7 days)       Procedure Component Value Units Date/Time    Tissue Culture - Aerobic [[id number]] Collected: 02/15/23 0755    Order Status: Completed Specimen: Tissue from Lung, RLL Updated: 02/19/23 0942     CULTURE, TISSUE- AEROBIC ([facility]) No Growth At 72 Hours    Anaerobic Culture [[id number]] Collected: 02/15/23 0755    Order Status: Completed Specimen: Tissue from Lung, RLL Updated: 02/18/23 0751     Anaerobe Culture No Anaerobes Isolated    AFB Smear [[id number]] Collected: 02/15/23 0755    Order Status: Completed Specimen: Tissue from Lung, RLL Updated: 02/17/23 0816     AFB Smear No AFB seen (Direct smear only)    Gram Stain [[id number]] Collected: 02/15/23 0755    Order Status: Completed Specimen: Tissue from Lung, RLL Updated: 02/16/23 1220     GRAM STAIN No WBCs, No bacteria seen    Fungal Culture [[id number]] Collected: 02/15/23 0755    Order Status: Sent Specimen: Tissue from Lung, RLL Updated: 02/16/23 0949              Imaging:   X-Ray Chest PA And Lateral  Narrative: EXAMINATION:  XR CHEST PA AND LATERAL    CLINICAL HISTORY:  ct removal;    TECHNIQUE:  Two views of the chest    COMPARISON:  02/17/2023    FINDINGS:  Trace right apical pneumothorax remains.  Interval removal of right-sided thoracostomy tube.  Prominent interstitial markings of bilateral lower lobes unchanged.  Impression: Trace right apical pneumothorax remains.  Interval removal of right-sided thoracostomy tube. Prominent interstitial markings of bilateral lower lobes unchanged.    Electronically signed by: Alfredo  Imer  Date:    02/18/2023  Time:    08:40      Assessment and Plan    Assessment:  -postoperative respiratory distress, acute hypoxemic respiratory failure   -suspected ILD status post right-sided VATS wedge biopsy of upper and lower lobes  -history of esophageal cancer status post radiation and chemotherapy finishing in December of 2022  -history of COVID-19 infection in 2021    Plan:  -supplement oxygen to maintain saturation 94-96%   -continue aggressive PT OT and ambulation as tolerated   -await final pathology from wedge biopsy to confirm the underlying pathologic diagnosis of his interstitial lung disease   -patient did receive radiation and chemotherapy for esophageal cancer with possibility of drug-induced pneumonitis with radiation and chemotherapy to explain his ILD as he did receive steroids at the time after he finished cancer treatment with suspicion of these with some improvement  -initiated on Vapotherm to help prevent atelectasis   -family requesting discharge to rehab afterwards   -continue on prednisone 20 mg p.o. b.i.d.      JOSE Hand  2/19/2023  Pulmonology/Critical Care

## 2023-02-19 NOTE — PROGRESS NOTES
"Eagle Patterson is a 62 y.o. male patient.   1. Lung mass    2. 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  Scheduled Meds:   albuterol-ipratropium  3 mL Nebulization Q6H    docusate sodium  100 mg Oral BID    enoxaparin  40 mg Subcutaneous Daily    famotidine  20 mg Oral BID    loperamide  4 mg Oral Once    predniSONE  20 mg Oral BID     Continuous Infusions:  PRN Meds:acetaminophen, albuterol, benzonatate, guaiFENesin 100 mg/5 ml, melatonin, metoclopramide HCl, morphine, ondansetron, oxyCODONE, oxyCODONE, sodium chloride 0.9%    Review of patient's allergies indicates:  No Known Allergies  There are no hospital problems to display for this patient.    Blood pressure 104/71, pulse (!) 119, temperature 97.7 °F (36.5 °C), temperature source Oral, resp. rate (!) 28, height 5' 10" (1.778 m), weight 73.5 kg (162 lb 0.6 oz), SpO2 97 %.    Subjective:    POD #4  Awake. Alert.  Sitting up in chair  Desats with minimal activity  Currently on vapotherm, oxygenating better  On po steroids     Objective:   AFVSS. 97% on vapotherm  Heart: RRR  Lungs: diminished  Incision: dressed, dry     Assesment/Plan:    S/p: R VATS wedge biopsy of upper and lower lobes  Await final path  Activity as tolerated  Rehab pending       LYN Diego  2/19/2023    "

## 2023-02-19 NOTE — PLAN OF CARE
Problem: Adult Inpatient Plan of Care  Goal: Plan of Care Review  Outcome: Ongoing, Progressing     Problem: Adult Inpatient Plan of Care  Goal: Patient-Specific Goal (Individualized)  Outcome: Ongoing, Progressing     Problem: Adult Inpatient Plan of Care  Goal: Absence of Hospital-Acquired Illness or Injury  Outcome: Ongoing, Progressing     Problem: Adult Inpatient Plan of Care  Goal: Optimal Comfort and Wellbeing  Outcome: Ongoing, Progressing     Problem: Adult Inpatient Plan of Care  Goal: Readiness for Transition of Care  Outcome: Ongoing, Progressing     Problem: Infection  Goal: Absence of Infection Signs and Symptoms  Outcome: Ongoing, Progressing     Problem: Fall Injury Risk  Goal: Absence of Fall and Fall-Related Injury  Outcome: Ongoing, Progressing     Problem: Gas Exchange Impaired  Goal: Optimal Gas Exchange  Outcome: Ongoing, Progressing     Problem: Skin Injury Risk Increased  Goal: Skin Health and Integrity  Outcome: Ongoing, Progressing

## 2023-02-20 LAB
AV INDEX (PROSTH): 0.68
AV MEAN GRADIENT: 3 MMHG
AV PEAK GRADIENT: 5 MMHG
AV VELOCITY RATIO: 0.76
BNP BLD-MCNC: 27.1 PG/ML
BSA FOR ECHO PROCEDURE: 1.88 M2
CV ECHO LV RWT: 0.79 CM
DOP CALC AO PEAK VEL: 1.15 M/S
DOP CALC AO VTI: 16 CM
DOP CALC LVOT PEAK VEL: 0.87 M/S
DOP CALCLVOT PEAK VEL VTI: 10.8 CM
E WAVE DECELERATION TIME: 90 MSEC
E/A RATIO: 0.89
E/E' RATIO: 6.06 M/S
ECHO LV POSTERIOR WALL: 1.39 CM (ref 0.6–1.1)
EJECTION FRACTION: 65 %
FRACTIONAL SHORTENING: 51 % (ref 28–44)
INTERVENTRICULAR SEPTUM: 1.42 CM (ref 0.6–1.1)
LEFT ATRIUM SIZE: 3.3 CM
LEFT ATRIUM VOLUME INDEX MOD: 23 ML/M2
LEFT ATRIUM VOLUME MOD: 43.7 CM3
LEFT INTERNAL DIMENSION IN SYSTOLE: 1.74 CM (ref 2.1–4)
LEFT VENTRICLE DIASTOLIC VOLUME INDEX: 27.32 ML/M2
LEFT VENTRICLE DIASTOLIC VOLUME: 51.9 ML
LEFT VENTRICLE MASS INDEX: 93 G/M2
LEFT VENTRICLE SYSTOLIC VOLUME INDEX: 4.7 ML/M2
LEFT VENTRICLE SYSTOLIC VOLUME: 8.91 ML
LEFT VENTRICULAR INTERNAL DIMENSION IN DIASTOLE: 3.53 CM (ref 3.5–6)
LEFT VENTRICULAR MASS: 176.03 G
LV LATERAL E/E' RATIO: 6.25 M/S
LV SEPTAL E/E' RATIO: 5.88 M/S
LVOT MG: 2 MMHG
LVOT MV: 0.55 CM/S
MV PEAK A VEL: 1.12 M/S
MV PEAK E VEL: 1 M/S
PV PEAK VELOCITY: 0.98 CM/S
TDI LATERAL: 0.16 M/S
TDI SEPTAL: 0.17 M/S
TDI: 0.17 M/S
TRICUSPID ANNULAR PLANE SYSTOLIC EXCURSION: 1.7 CM

## 2023-02-20 PROCEDURE — 63600175 PHARM REV CODE 636 W HCPCS: Performed by: INTERNAL MEDICINE

## 2023-02-20 PROCEDURE — 25000003 PHARM REV CODE 250: Performed by: SPECIALIST

## 2023-02-20 PROCEDURE — 94640 AIRWAY INHALATION TREATMENT: CPT

## 2023-02-20 PROCEDURE — 99900035 HC TECH TIME PER 15 MIN (STAT)

## 2023-02-20 PROCEDURE — 97530 THERAPEUTIC ACTIVITIES: CPT | Mod: CQ

## 2023-02-20 PROCEDURE — 83880 ASSAY OF NATRIURETIC PEPTIDE: CPT | Performed by: INTERNAL MEDICINE

## 2023-02-20 PROCEDURE — 21400001 HC TELEMETRY ROOM

## 2023-02-20 PROCEDURE — 27100171 HC OXYGEN HIGH FLOW UP TO 24 HOURS

## 2023-02-20 PROCEDURE — 25000003 PHARM REV CODE 250: Performed by: PHYSICIAN ASSISTANT

## 2023-02-20 PROCEDURE — 97110 THERAPEUTIC EXERCISES: CPT | Mod: CQ

## 2023-02-20 PROCEDURE — 63600175 PHARM REV CODE 636 W HCPCS: Performed by: SPECIALIST

## 2023-02-20 PROCEDURE — 94761 N-INVAS EAR/PLS OXIMETRY MLT: CPT

## 2023-02-20 PROCEDURE — 94799 UNLISTED PULMONARY SVC/PX: CPT

## 2023-02-20 PROCEDURE — 27000221 HC OXYGEN, UP TO 24 HOURS

## 2023-02-20 PROCEDURE — 25000003 PHARM REV CODE 250: Performed by: INTERNAL MEDICINE

## 2023-02-20 PROCEDURE — 25000242 PHARM REV CODE 250 ALT 637 W/ HCPCS: Performed by: PHYSICIAN ASSISTANT

## 2023-02-20 RX ORDER — MYCOPHENOLATE MOFETIL 250 MG/1
1000 CAPSULE ORAL 2 TIMES DAILY
Status: DISCONTINUED | OUTPATIENT
Start: 2023-02-20 | End: 2023-02-22 | Stop reason: HOSPADM

## 2023-02-20 RX ADMIN — GUAIFENESIN 600 MG: 600 TABLET, EXTENDED RELEASE ORAL at 09:02

## 2023-02-20 RX ADMIN — IPRATROPIUM BROMIDE AND ALBUTEROL SULFATE 3 ML: 2.5; .5 SOLUTION RESPIRATORY (INHALATION) at 08:02

## 2023-02-20 RX ADMIN — ENOXAPARIN SODIUM 40 MG: 40 INJECTION SUBCUTANEOUS at 05:02

## 2023-02-20 RX ADMIN — IPRATROPIUM BROMIDE AND ALBUTEROL SULFATE 3 ML: 2.5; .5 SOLUTION RESPIRATORY (INHALATION) at 07:02

## 2023-02-20 RX ADMIN — PREDNISONE 20 MG: 20 TABLET ORAL at 09:02

## 2023-02-20 RX ADMIN — IPRATROPIUM BROMIDE AND ALBUTEROL SULFATE 3 ML: 2.5; .5 SOLUTION RESPIRATORY (INHALATION) at 02:02

## 2023-02-20 RX ADMIN — OXYCODONE HYDROCHLORIDE 10 MG: 5 TABLET ORAL at 01:02

## 2023-02-20 RX ADMIN — FAMOTIDINE 20 MG: 20 TABLET, FILM COATED ORAL at 09:02

## 2023-02-20 RX ADMIN — OXYCODONE HYDROCHLORIDE 10 MG: 5 TABLET ORAL at 09:02

## 2023-02-20 RX ADMIN — DOCUSATE SODIUM 100 MG: 100 CAPSULE, LIQUID FILLED ORAL at 09:02

## 2023-02-20 RX ADMIN — ALPRAZOLAM 0.25 MG: 0.25 TABLET ORAL at 07:02

## 2023-02-20 RX ADMIN — OXYCODONE HYDROCHLORIDE 10 MG: 5 TABLET ORAL at 05:02

## 2023-02-20 RX ADMIN — BENZONATATE 100 MG: 100 CAPSULE ORAL at 09:02

## 2023-02-20 RX ADMIN — IPRATROPIUM BROMIDE AND ALBUTEROL SULFATE 3 ML: 2.5; .5 SOLUTION RESPIRATORY (INHALATION) at 01:02

## 2023-02-20 RX ADMIN — MYCOPHENOLATE MOFETIL 1000 MG: 250 CAPSULE ORAL at 09:02

## 2023-02-20 RX ADMIN — ALPRAZOLAM 0.25 MG: 0.25 TABLET ORAL at 10:02

## 2023-02-20 NOTE — PRE ADMISSION SCREENING
[facility]    Pre-Admission Patient Screening                    Pre-Screen type:  LTAC    Facility Status: Pending Review    Referring Physician:  Sundeep Abrams    Admitting Physician:  Sundeep Abrams IV, MD    Primary Care Physician:  Chuck Lawrence MD    History         Patient Active Problem List    Diagnosis Date Noted    Personal history of COVID-19 08/11/2022    Post-COVID syndrome 08/11/2022    Pure hypercholesterolemia 07/28/2022    Microcytic anemia 07/28/2022    Stage 3a chronic kidney disease 07/28/2022    Hypervitaminosis D 07/28/2022         Previous Specialties/Consulted physicians:      Pulmonology  and Other: Cardiothoracic      Past and Current Medical History    Past Medical History:   Diagnosis Date    COVID-19     Esophageal cancer     cT2-3N1/chemo & radiation    HLD (hyperlipidemia)     Hypervitaminosis D 07/28/2022    Microcytic anemia 07/28/2022    On home oxygen therapy     Pure hypercholesterolemia 07/28/2022    Stage 3a chronic kidney disease 07/28/2022    Thyroid disease            History of Present Illness     This is a 61 y/o CM who is a patient of Dr. Ellis with history of esophageal carcinoma treated with chemoradiation at [facility] [place], COVID 2021, and apparently some degree of underlying interstitial lung disease.  In the distant past patient was undergoing workup for ILD was incidentally found to have the above esophageal cancer ILD workup was placed on hold so the patient can proceed with cancer treatment.  Patient was sent back to our clinic for further ILD workup autoimmune labs were ordered, V/Q scan and echocardiogram all were negative the patient continued to have increasing cough and dyspnea despite prednisone 20 mg.  They were agreeable to proceed with surgical lung biopsy patient was then referred to Dr. Abrams he was admitted to [facility] on 02/15/2023 underwent right VATS wedge biopsy of upper and lower lobes.  Postoperatively  pulmonary was consulted for further management evaluations.  patient on Vapotherm 25 L and 90% this morning   -O2 saturations down in his 60s with movement without Vapotherm  -she is sitting upright in bedside chair planning to shave this morning complaining of shortness of breaths without significant productive cough or any cough at all  LTACH Admission Criteria:    Management of at least one of the following complex respiratory conditions:  Bronchodilators (excluding MDIs) greater than or equal to 4 times in 24 hours  Cardiac monitoring for dyspnea, electrolyte imbalances, post pacer insertion, significant arrhythmia, or syncope/pre-syncope  Oxygen greater than or equal to 40%    Must meet at least three of the following concomitant treatments/intervention daily unless notes: (excludes PO meds unless notes)  Bronchodilators  Cardiac Monitoring  Nebulizer treatments at least every 6 hours  Oxygen and SaO2/ABG adjustments and greater than or equal to 28% supplemental O2      LTACH more appropriate than other levels of care (eg, skilled nursing facility, home health care), as indicated by:    Clinical management of patient deemed too frequent and needed beyond the capabilities of alternative levels of care as evidence by: Continued Titration of IV Medications, Continued use of IV analgesics, Blood glucose monitoring greater than or equal to 4 times daily requiring clinical intervention, Active titration of oxygen , and Frequency of IV medications greater than or equal to 2 times daily  Frequent diagnostic services needed on an inpatient basis, including clinical assessments, laboratory, and imaging as evidence by: Frequent monitoring and clinical assessments performed by a licensed RN to identify current and future patient needs by incorporating the recognition of normal vs abnormal body physiology, and to prompt recognition of pertinent changes to identify and prioritize appropriate interventions that can be  performed within the acute inpatient setting. , Frequent monitoring and clinical assessments performed by a licensed RT to identify current and future patient needs by incorporating the recognition of normal vs abnormal body physiology of the Respiratory System, and to prompt recognition of pertinent changes to identify and prioritize appropriate interventions that can be performed within the acute inpatient setting. , and Frequent laboratory testing and/or imaging to aide in the improvement and effectiveness of patient's individualized treatment plan.   More intensive services, such as speciality nursing care, and/or onsite physician assessments needed that are not available at a lower level of care as evidence by: Daily physician intervention , Collaboration between consulting and attending providers still deemed a necessity to aide in the improvement and effectiveness of patient's individualized treatment plan, which can be provided at an [facility] level of care. , Continued inpatient hemodialysis (HD), Continued inpatient hyperbarics (HBO) , and Therapy Services to be included in patient's treatment plan in an effort to restore/improve patient's modality status to a safe level of functioning prior to acute illness.    Lower level of care has failed (eg, patient readmitted to acute care from a lower level of care).   Palliative and/or Hospice Care has been refused by patient/patient family.    Patient is stable for transfer to [facility], as indicated by ALL of the following:      Hypotension Absent     Cardiovascular status stable     Stable chest findings     Renal function accepctable   Pain adequately managed    Intake acceptable       No acute significant hepatic dysfunction (eg, new encephalopathy)   No acute severe unstable neurologic abnormalities (eg, Altered mental status that is severe or persistent, or evidence of ongoing CNS embolization or ischemia, worsening hydrocephalus)   No active bleeding or unstable  disorders of hemostasis (eg, no recent need for transfusion, severe thrombocytopenia with bleeding)   No need for respiratory or other isolation, OR manageable at LTACH level of care    Long-term enteral feeding (eg, PEG) and intravenous access established, not needed, OR to be placed at LTACH level of care      Anticipated Discharge Disposition:    Home with caregiver support             Patient Traveled outside of the [place] in the last 3 months? not applicable     Patient discharged from this LTAC to SNF within the last 45 days? no    Patient discharged from this LTAC to Rehab within the last 27 days? no    Prior residence: home    Prior Post-Acute Services: N/A     Allergies: Review of patient's allergies indicates:  No Known Allergies    Has patient received the current influenza vaccine (Oct 1 - )? Unknown     Has patient received PPD skin test prior to admit? N/A     Code Status: No Order    Orientation: Time, Place, Person, and Events    Speech: normal     Vital Signs:     Date     Blood Pressure     Pulse     Respiratory Rate     O2 Saturation     Temperature         Bowel/Bladder: continent of bladder and continent of bowel    Dialysis: N/A         CBGs/Accuchecks: No     Precautions: Fall    Restraints: No     Isolation Precautions: N/A       Facility-Administered Medications as of 2023   Medication Dose Route Frequency Provider Last Rate Last Admin    [] 0.45% NaCl infusion  75 mL/hr Intravenous Continuous Sundeep Abrams IV, MD 75 mL/hr at 02/15/23 1152 75 mL/hr at 02/15/23 1152    acetaminophen tablet 650 mg  650 mg Oral Q4H PRN Sundeep Abrams IV, MD        albuterol inhaler 2 puff  2 puff Inhalation Q6H PRN LYN Robb        albuterol-ipratropium 2.5 mg-0.5 mg/3 mL nebulizer solution 3 mL  3 mL Nebulization Q6H LYN Gloria   3 mL at 23 0817    ALPRAZolam tablet 0.25 mg  0.25 mg Oral TID PRN Maximo Ellis MD   0.25 mg at 23 1017    benzonatate  capsule 100 mg  100 mg Oral TID PRN LYN Gloria   100 mg at 23    [COMPLETED] cefazolin (ANCEF) 2 gram in dextrose 5% 50 mL IVPB (premix)  2 g Intravenous Q8H Sundeep Abrams IV, MD   Stopped at 23 0103    [COMPLETED] cefUROXime sodium 1.5 g in dextrose 5 % in water (D5W) 5 % 100 mL IVPB (MB+)  1.5 g Intravenous Once Pre-Op Sundeep Abrams IV, MD   1.5 g at 02/15/23 0731    [COMPLETED] diphenhydrAMINE injection 25 mg  25 mg Intravenous Once PRN Sundeep Abrams IV, MD   25 mg at 02/15/23 1100    docusate sodium capsule 100 mg  100 mg Oral BID Sundeep Abrams IV, MD   100 mg at 23 0924    enoxaparin injection 40 mg  40 mg Subcutaneous Daily Sundeep Abrams IV, MD   40 mg at 23 1700    famotidine tablet 20 mg  20 mg Oral BID Sundeep Abrams IV, MD   20 mg at 23 0925    guaiFENesin 12 hr tablet 600 mg  600 mg Oral BID Maximo Ellis MD   600 mg at 23 0924    [COMPLETED] ketorolac injection 15 mg  15 mg Intravenous QID Sundeep Abrams IV, MD   15 mg at 23 0520    loperamide capsule 4 mg  4 mg Oral Once Sundeep Abrams IV, MD        melatonin tablet 6 mg  6 mg Oral Nightly PRN Sundeep Abrams IV, MD        [COMPLETED] methocarbamoL injection 1,000 mg  1,000 mg Intravenous Once Sundeep Abrams IV, MD   1,000 mg at 02/15/23 0845    metoclopramide HCl injection 5 mg  5 mg Intravenous Q6H PRN Sundeep Abrams IV, MD        morphine injection 2 mg  2 mg Intravenous Q4H PRN Asaf Andrade MD   2 mg at 23 0958    [] mupirocin 2 % ointment 1 g  1 g Nasal BID Sundeep Abrams IV, MD   1 g at 23 2111    ondansetron disintegrating tablet 8 mg  8 mg Oral Q8H PRN Sundeep Abrams IV, MD        oxyCODONE immediate release tablet 10 mg  10 mg Oral Q4H PRN LYN Gloria   10 mg at 23 0931    oxyCODONE immediate release tablet 5 mg  5 mg Oral Q4H PRN LYN Gloria   5 mg at 23 1110    predniSONE tablet 20 mg  20 mg Oral  BID Asaf Andrade MD   20 mg at 02/20/23 0925    [COMPLETED] promethazine injection 12.5 mg  12.5 mg Intramuscular Once PRN Sundeep Abrams IV, MD   12.5 mg at 02/15/23 0845    sodium chloride 0.9% flush 10 mL  10 mL Intravenous PRN Barrington Bolton DO        [COMPLETED] vancomycin in dextrose 5 % 1 gram/250 mL IVPB 1,000 mg  1,000 mg Intravenous Q12H Sundeep Abrams IV, MD   Stopped at 02/16/23 2241     Outpatient Medications as of 2/20/2023   Medication Sig Dispense Refill    albuterol (PROAIR HFA) 90 mcg/actuation inhaler Inhale 2 puffs into the lungs every 6 (six) hours as needed for Wheezing. Rescue 18 g 3    aluminum-magnesium hydroxide-simethicone (MAALOX) 200-200-20 mg/5 mL Susp Take by mouth.      ARMOUR THYROID 60 mg Tab Take 60 mg by mouth once daily.      benzonatate (TESSALON) 100 MG capsule Take 100 mg by mouth 3 (three) times daily as needed for Cough.      esomeprazole (NEXIUM) 40 MG capsule Take 40 mg by mouth.      montelukast (SINGULAIR) 10 mg tablet Take 1 tablet (10 mg total) by mouth once daily. 90 tablet 3    omeprazole-sodium bicarbonate (ZEGERID) 40-1.1 mg-gram per capsule Take 1 capsule by mouth as needed.      predniSONE (DELTASONE) 10 MG tablet Take 1 tablet (10 mg total) by mouth once daily. (Patient taking differently: Take 40 mg by mouth once daily.) 30 tablet 4    hydrocortisone (CORTEF) 10 MG Tab Take 10 mg by mouth 2 (two) times daily.      icosapent ethyL (VASCEPA) 1 gram Cap Take 2 capsules by mouth 2 (two) times daily.      montelukast (SINGULAIR) 10 mg tablet Take 10 mg by mouth.      NEXIUM 40 mg capsule Take 40 mg by mouth.      sulfamethoxazole-trimethoprim 800-160mg (BACTRIM DS) 800-160 mg Tab Take 1 tablet by mouth.      thyroid, pork, (ARMOUR THYROID) 60 mg Tab Take 60 mg by mouth.          Cardiovascular:    Cardiovascular Review: Within definable limits (WDL)    Telemetry: Yes    Rhythm: N/A    AICD: No      Respiratory:    Oxygen:  Vapotherm at 25L and  "90%    CPT/Frequency: N/A    Incentive Spirometer/Frequency: N/A    CPAP/Settings: N/A    BiPAP/Settings: N/A    Oxygen Saturation: patient on Vapotherm 25 L and 90% this morning   -O2 saturations down in his 60s with movement without Vapotherm. Sats mid 90's on Vapotherm.    Suction Frequency: N/A      Vent Settings:   Mode:   Rate:   TV:   FIO2:   PEEP:   PS:   iTime:    Trial/HS Settings:   Mode:   Rate:   TV:   FIO2:   PEEP:   PS:       Nutrition:      Ht Readings from Last 3 Encounters:   02/15/23 5' 10" (1.778 m)   02/07/23 5' 11" (1.803 m)   01/10/23 5' 11" (1.803 m)     Wt Readings from Last 1 Encounters:   02/20/23 0623 72.5 kg (159 lb 13.3 oz)   02/19/23 0635 73.5 kg (162 lb 0.6 oz)   02/18/23 0646 74.9 kg (165 lb 2 oz)   02/17/23 0656 76.3 kg (168 lb 3.4 oz)   02/16/23 0617 75.3 kg (166 lb 1.6 oz)   02/15/23 1600 71.8 kg (158 lb 4.6 oz)   02/15/23 0601 71.8 kg (158 lb 4.6 oz)   02/09/23 1248 76.2 kg (168 lb)       Feeding Status:   Current Diet: Regulat   Supplements:Boost Chocolate   Tube Feeding: N/A   Flushes: N/A      Integumentary:    Integumentary: Within definable limits (WDL)              Incision/Site 02/15/23 0848 Right Chest (Active)   02/15/23 0848   Present Prior to Hospital Arrival?:    Side: Right   Location: Chest   Orientation:    Incision Type:    Closure Method:    Additional Comments:    Removal Indication and Assessment:    Wound Outcome:    Removal Indications:    Incision WDL WDL 02/19/23 2000   Dressing Appearance Open to air 02/19/23 2000   Drainage Amount None 02/19/23 0801   Drainage Characteristics/Odor Sanguineous;Serosanguineous;Serous 02/15/23 2000   Appearance Intact 02/19/23 2000   Dressing Gauze 02/17/23 2000   Number of days: 5         Musculoskeletal:    Transfer assist:  N/A    Weight Bearing Status: Full    Comments: N/A    ADL Assist:  N/A    Special Equipment: N/A    Radiology:    Radiology (Last 168 hours)               02/20 1257 CARDIAC MONITORING STRIPS     " 02/20 0742 CARDIAC MONITORING STRIPS     02/20 0532 X-Ray Chest 1 View       02/20 0304 CARDIAC MONITORING STRIPS     02/19 2342 CARDIAC MONITORING STRIPS     02/19 1529 CARDIAC MONITORING STRIPS     02/19 1132 CARDIAC MONITORING STRIPS     02/19 0738 CARDIAC MONITORING STRIPS     02/19 0250 CARDIAC MONITORING STRIPS     02/18 2252 CARDIAC MONITORING STRIPS     02/18 1908 CARDIAC MONITORING STRIPS     02/18 1426 CARDIAC MONITORING STRIPS     02/18 1102 CARDIAC MONITORING STRIPS     02/18 0703 X-Ray Chest PA And Lateral       02/18 0627 CARDIAC MONITORING STRIPS     02/18 0249 CARDIAC MONITORING STRIPS     02/17 2315 CARDIAC MONITORING STRIPS     02/17 1856 CARDIAC MONITORING STRIPS     02/17 1436 CARDIAC MONITORING STRIPS     02/17 1034 CARDIAC MONITORING STRIPS     02/17 0641 CARDIAC MONITORING STRIPS     02/17 0611 X-Ray Chest AP Single View       02/17 0438 CARDIAC MONITORING STRIPS     02/17 0438 CARDIAC MONITORING STRIPS     02/17 0438 CARDIAC MONITORING STRIPS     02/16 1517 CARDIAC MONITORING STRIPS     02/16 1114 CARDIAC MONITORING STRIPS     02/16 0654 CARDIAC MONITORING STRIPS     02/16 0519 X-Ray Chest AP Single View       02/16 0356 CARDIAC MONITORING STRIPS     02/15 1945 CARDIAC MONITORING STRIPS     02/15 1518 CARDIAC MONITORING STRIPS     02/15 0843 X-Ray Chest AP Single View       02/14 1344 X-Ray Chest PA And Lateral                X-Ray Chest 1 View  Narrative: EXAMINATION:  XR CHEST 1 VIEW    CLINICAL HISTORY:  post op; Interstitial pulmonary disease, unspecified    TECHNIQUE:  Single frontal view of the chest was performed.    COMPARISON:  02/18/2023    FINDINGS:  LINES AND TUBES: EKG/telemetry leads overlie the chest.    MEDIASTINUM AND CHYNA: The cardiac silhouette is normal.    LUNGS: Low lung volumes.  Bilateral interstitial opacities most pronounced at the lung bases, unchanged.    PLEURA:No pleural effusion.Unchanged small right apical pneumothorax, 5%.    OTHER: No acute osseous  abnormality.  Impression: Unchanged small right apical pneumothorax.    Electronically signed by: Corinna Muir  Date:    02/20/2023  Time:    08:52      Lab/Cultures:    Blood Urea Nitrogen   Date Value Ref Range Status   02/17/2023 17.6 8.4 - 25.7 mg/dL Final   02/16/2023 16.8 8.4 - 25.7 mg/dL Final     Creatinine   Date Value Ref Range Status   02/17/2023 0.80 0.73 - 1.18 mg/dL Final   02/16/2023 0.97 0.73 - 1.18 mg/dL Final     WBC   Date Value Ref Range Status   02/16/2023 9.5 4.5 - 11.5 x10(3)/mcL Final   02/15/2023 10.4 4.5 - 11.5 x10(3)/mcL Final      No results found for: CULTBLD, LABBLOO, CULBFAERO, CULBFANAERO, CULTGAS, CULTMRSA, CULTSTOOL, THROATCULTA, CULTURESP, LABURIN, URINESENSE, CULWND, TCULT, RESPIRATORYC, CDIFFICILEAN, CDIFFTOX  No results for input(s): PH, PCO2, PO2, HCO3, POCSATURATED, BE in the last 72 hours.

## 2023-02-20 NOTE — PLAN OF CARE
02/20/23 1315   Post-Acute Status   Post-Acute Authorization Placement   Post-Acute Placement Status Referrals Sent   Coverage Blue Cross Blue Shield and Medi Share   Discharge Plan   Discharge Plan A Long-term acute care facility (LTAC)   Discharge Plan B Long-term acute care facility (LTAC)     Patient denied Rehab at [facility] per [facility].  Patient found to be more LTAC appropriate because of high flow oxygen, referral sent to [facility], will follow up.

## 2023-02-20 NOTE — PROGRESS NOTES
Pulmonary & Critical Care Medicine   Progress Note      Presenting History/HPI:  This is a 63 y/o CM who is a patient of Dr. Ellis with history of esophageal carcinoma treated with chemoradiation at [facility], COVID 2021, and apparently some degree of underlying interstitial lung disease.  In the distant past patient was undergoing workup for ILD was incidentally found to have the above esophageal cancer ILD workup was placed on hold so the patient can proceed with cancer treatment.  Patient was sent back to our clinic for further ILD workup autoimmune labs were ordered, V/Q scan and echocardiogram all were negative the patient continued to have increasing cough and dyspnea despite prednisone 20 mg.  They were agreeable to proceed with surgical lung biopsy patient was then referred to Dr. Abrams he was admitted to [facility] on 02/15/2023 underwent right VATS wedge biopsy of upper and lower lobes.  Postoperatively pulmonary was consulted for further management evaluations.         Interval History:  -patient on Vapotherm 25 L and 90% this morning   -O2 saturations down in his 60s with movement without Vapotherm  -she is sitting upright in bedside chair planning to shave this morning complaining of shortness of breaths without significant productive cough or any cough at all      Scheduled Medications:    albuterol-ipratropium  3 mL Nebulization Q6H    docusate sodium  100 mg Oral BID    enoxaparin  40 mg Subcutaneous Daily    famotidine  20 mg Oral BID    guaiFENesin  600 mg Oral BID    loperamide  4 mg Oral Once    predniSONE  20 mg Oral BID       PRN Medications:   acetaminophen, albuterol, ALPRAZolam, benzonatate, melatonin, metoclopramide HCl, morphine, ondansetron, oxyCODONE, oxyCODONE, sodium chloride 0.9%      Infusions:          Fluid Balance:     Intake/Output Summary (Last 24 hours) at 2/20/2023 1158  Last data filed at 2/20/2023 0623  Gross per 24 hour   Intake 1800 ml   Output 1000 ml    Net 800 ml           Vital Signs:   Vitals:    02/20/23 1111   BP: 99/65   Pulse: (!) 120   Resp: (!) 24   Temp: 97.7 °F (36.5 °C)         Physical Exam  Vitals and nursing note reviewed.   Constitutional:       General: He is not in acute distress.     Appearance: Normal appearance. He is not ill-appearing or toxic-appearing.   HENT:      Head: Normocephalic and atraumatic.      Right Ear: External ear normal.      Left Ear: External ear normal.      Nose: Nose normal.      Mouth/Throat:      Mouth: Mucous membranes are moist.      Pharynx: Oropharynx is clear. No oropharyngeal exudate or posterior oropharyngeal erythema.   Eyes:      General: No scleral icterus.     Extraocular Movements: Extraocular movements intact.      Conjunctiva/sclera: Conjunctivae normal.      Pupils: Pupils are equal, round, and reactive to light.   Neck:      Vascular: No carotid bruit.   Cardiovascular:      Rate and Rhythm: Normal rate and regular rhythm.      Pulses: Normal pulses.      Heart sounds: Normal heart sounds. No murmur heard.    No friction rub. No gallop.   Pulmonary:      Effort: Pulmonary effort is normal. No respiratory distress.      Breath sounds: Rhonchi and rales present. No wheezing.      Comments: Rhonchi and rales heard bilaterally right greater than left predominantly at the bases  Abdominal:      General: Abdomen is flat. Bowel sounds are normal. There is no distension.      Palpations: Abdomen is soft.      Tenderness: There is no abdominal tenderness. There is no guarding or rebound.   Genitourinary:     Comments: deferred  Musculoskeletal:         General: No swelling or deformity. Normal range of motion.      Cervical back: Normal range of motion and neck supple. No rigidity or tenderness.   Lymphadenopathy:      Cervical: No cervical adenopathy.   Skin:     General: Skin is warm and dry.      Capillary Refill: Capillary refill takes less than 2 seconds.      Coloration: Skin is not jaundiced.       Findings: No bruising, lesion or rash.   Neurological:      General: No focal deficit present.      Mental Status: He is alert.      Sensory: No sensory deficit.      Motor: No weakness.   Psychiatric:         Mood and Affect: Mood normal.       Laboratory Studies:   No results for input(s): PH, PCO2, PO2, HCO3, POCSATURATED, BE in the last 24 hours.  No results for input(s): WBC, RBC, HGB, HCT, PLT, MCV, MCH, MCHC in the last 24 hours.  No results for input(s): GLUCOSE, NA, K, CL, CO2, BUN, CREATININE, MG in the last 24 hours.    Invalid input(s):  CALCIUM      Microbiology Data:   Microbiology Results (last 7 days)       Procedure Component Value Units Date/Time    Tissue Culture - Aerobic [[id number]] Collected: 02/15/23 0755    Order Status: Completed Specimen: Tissue from Lung, RLL Updated: 02/20/23 0835     CULTURE, TISSUE- AEROBIC ([facility]) No growth at 4 days    Anaerobic Culture [[id number]] Collected: 02/15/23 0755    Order Status: Completed Specimen: Tissue from Lung, RLL Updated: 02/18/23 0751     Anaerobe Culture No Anaerobes Isolated    AFB Smear [[id number]] Collected: 02/15/23 0755    Order Status: Completed Specimen: Tissue from Lung, RLL Updated: 02/17/23 0816     AFB Smear No AFB seen (Direct smear only)    Gram Stain [[id number]] Collected: 02/15/23 0755    Order Status: Completed Specimen: Tissue from Lung, RLL Updated: 02/16/23 1220     GRAM STAIN No WBCs, No bacteria seen    Fungal Culture [[id number]] Collected: 02/15/23 0755    Order Status: Sent Specimen: Tissue from Lung, RLL Updated: 02/16/23 0949              Imaging:   X-Ray Chest 1 View  Narrative: EXAMINATION:  XR CHEST 1 VIEW    CLINICAL HISTORY:  post op; Interstitial pulmonary disease, unspecified    TECHNIQUE:  Single frontal view of the chest was performed.    COMPARISON:  02/18/2023    FINDINGS:  LINES AND TUBES: EKG/telemetry leads overlie the chest.    MEDIASTINUM AND CHYNA: The cardiac silhouette is normal.    LUNGS: Low lung volumes.   Bilateral interstitial opacities most pronounced at the lung bases, unchanged.    PLEURA:No pleural effusion.Unchanged small right apical pneumothorax, 5%.    OTHER: No acute osseous abnormality.  Impression: Unchanged small right apical pneumothorax.    Electronically signed by: Corinna Muir  Date:    02/20/2023  Time:    08:52          Assessment and Plan    Assessment:  Acute hypoxemic respiratory failure with postoperative respiratory distress   -interstitial lung disease status post right-sided VATS wedge biopsy of upper and lower lobes  -history of esophageal cancer status post radiation and chemotherapy finishing December 2022   -history of COVID-19 infection in 2021        Plan:  -supplement oxygen to maintain saturation 94-96%   -aggressive PT OT as tolerated   --pathology from lung biopsy still in process/pending, await final results to determine course of therapy   -patient did receive radiation and chemotherapy for esophageal cancer, it 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 b.i.d. continue for now   -on personal evaluation of his chest x-ray performed today compared to 02/18 study he does have a small residual right apical pneumothorax in addition to slightly increased right-sided perihilar reticular/interstitial prominence in addition to an increase in left basilar interstitial opacification, it is unclear if this is worsening of his baseline disease versus if he is developing pulmonary edema, will check BNP in order another 2D echo  -transfer to rehab pending at this time        Mikhail Roca MD  2/20/2023  Pulmonology/Critical Care

## 2023-02-20 NOTE — PROGRESS NOTES
Inpatient Nutrition Assessment    Admit Date: 2/15/2023   Total duration of encounter: 5 days     Nutrition Recommendation/Prescription     -Continue regular diet as tolerated.   -Continue Boost Very High Calorie (provides 530 kcal, 22 g protein per serving) with all meals for additional nutrition.      Communication of Recommendations: reviewed with nurse and EMR    Nutrition Assessment     Malnutrition Assessment/Nutrition-Focused Physical Exam    Malnutrition in the context of acute illness or injury  Degree of Malnutrition: unable to complete  Energy Intake: unable to obtain  Interpretation of Weight Loss: >5% in 1 month  Body Fat:unable to obtain  Area of Body Fat Loss: not applicable  Muscle Mass Loss: unable to obtain  Area of Muscle Mass Loss: not applicable  Fluid Accumulation: does not meet criteria  Edema: no edema present   Reduced  Strength: unable to obtain  A minimum of two characteristics is recommended for diagnosis of either severe or non-severe malnutrition.   Chart Review    Reason Seen: length of stay    Malnutrition Screening Tool Results   Have you recently lost weight without trying?: No  Have you been eating poorly because of a decreased appetite?: Yes   MST Score: 1     Diagnosis:  Acute hypoxemic respiratory failure with postoperative respiratory distress   -interstitial lung disease status post right-sided VATS wedge biopsy of upper and lower lobes  Dysphagia, unspecified type     Relevant Medical History:  esophageal carcinoma treated with chemoradiation at [facility] (finished December 2022) COVID 2021, underlying interstitial lung disease s/p right VATS wedge biopsy of upper and lower lobes 02/15/2023, CKD, HLD, Steroid dependency     Nutrition-Related Medications: docusate sodium, famotidine, loperamide, prednisone,   Calorie Containing IV Medications: no significant kcals from medications at this time    Nutrition-Related Labs:  2/17/23 CO2 33, Gluc 135    Diet/PN  "Order: Diet Adult Regular  Oral Supplement Order: Boost VHC  Tube Feeding Order: none  Appetite/Oral Intake: fair/50-75% of meals  Factors Affecting Nutritional Intake: difficulty/impaired swallowing  Food/Yazidism/Cultural Preferences: none reported  Food Allergies: none reported    Skin Integrity: abrasion, bruised (ecchymotic), incision  Wound(s):       Comments    2/20/23 Pt working with therapy. Spoke to RN, states fair appetite. Getting Boost with all meals and seems to be drinking. Noted history of esophageal cancer and treatment and significant acute and chronic wt loss. Will follow-up early for more subjective information and NFPA.    Anthropometrics    Height: 5' 10" (177.8 cm) Height Method: Stated  Last Weight: 72.5 kg (159 lb 13.3 oz) (02/20/23 0623) Weight Method: Standard Scale  BMI (Calculated): 22.9  BMI Classification: normal (BMI 18.5-24.9)        Ideal Body Weight (IBW), Male: 166 lb     % Ideal Body Weight, Male (lb): 95.36 %                          Usual Weight Provided By: EMR weight history    Wt Readings from Last 5 Encounters:   02/20/23 72.5 kg (159 lb 13.3 oz)   02/07/23 76.2 kg (168 lb)   01/10/23 78.9 kg (174 lb)   08/11/22 92.5 kg (204 lb)     Weight Change(s) Since Admission:  Admit Weight: 76.2 kg (168 lb) (02/09/23 1248)  2/20/23 72.5kg wt today in EMR. Noted significant wt loss: 8% wt loss x1 month and >20% wt loss x6 months.     Estimated Needs    Weight Used For Calorie Calculations: 72.5 kg (159 lb 13.3 oz)  Energy Calorie Requirements (kcal): 2175kcals/d (30kacls/kg)  Energy Need Method: Kcal/kg  Weight Used For Protein Calculations: 72.5 kg (159 lb 13.3 oz)  Protein Requirements: 87-101g/kg (1.2-1.4g/kg)  Fluid Requirements (mL): 2175ml fl/d (30ml/kg)  Temp: 97.7 °F (36.5 °C)       Enteral Nutrition    Patient not receiving enteral nutrition at this time.    Parenteral Nutrition    Patient not receiving parenteral nutrition support at this time.    Evaluation of Received " Nutrient Intake    Calories: not meeting estimated needs  Protein: not meeting estimated needs    Patient Education    Not applicable.    Nutrition Diagnosis     PES: Unintended weight loss related to esophageal cancer as evidenced by >5% wt loss x1 month and >20% wt loss x6 months. (new)    Interventions/Goals     Intervention(s): modified composition of meals/snacks, commercial beverage, and collaboration with other providers  Goal: Meet greater than 75% of nutritional needs by follow-up. (new)    Monitoring & Evaluation     Dietitian will monitor energy intake, weight change, electrolyte/renal panel, and glucose/endocrine profile.  Nutrition Risk/Follow-Up: high (follow-up in 1-4 days)   Please consult if re-assessment needed sooner.

## 2023-02-20 NOTE — PT/OT/SLP PROGRESS
Physical Therapy Treatment    Patient Name:  Eagle Patterson   MRN:  [id number]    Recommendations:     Discharge Recommendations: LTACH (long-term acute care hospital)  Discharge Equipment Recommendations: none  Barriers to discharge:  placement    Assessment:     Eagle Patterson is a 62 y.o. male admitted with a medical diagnosis of of s/p R VATS wedge biopsy; post-op respiratory distress. Pt with PMH of esophageal carcinoma.  He presents with the following impairments/functional limitations: weakness, impaired endurance .  Pt abebe tx well. O2 remained in 90's during activity. Unable to amb secondary to vapotherm.    Rehab Prognosis: Good; patient would benefit from acute skilled PT services to address these deficits and reach maximum level of function.    Recent Surgery: Procedure(s) (LRB):  VATS (VIDEO-ASSISTED THORACOSCOPIC SURGERY) (Right) 5 Days Post-Op    Plan:     During this hospitalization, patient to be seen 6 x/week to address the identified rehab impairments via gait training, therapeutic activities, therapeutic exercises and progress toward the following goals:    Plan of Care Expires:  03/17/23    Subjective     Chief Complaint: none  Patient/Family Comments/goals: get better  Pain/Comfort:         Objective:     Communicated with RN prior to session.  Patient found up in chair with oxygen upon PT entry to room.     General Precautions: Standard,    Orthopedic Precautions: N/A  Braces: N/A  Respiratory Status:  Vapotherm 20LMP, O2 90%  Skin Integrity: Visible skin intact      Functional Mobility:  Transfers:     Sit to Stand:  independence with no AD  Balance: sba    Therapeutic Activities/Exercises:  Pt stood for 3' SBA. Performed 4 sit<>stands no AD. Performed hip flexion, heel raises, HSC, LAQ, seated marches.    Education:  Patient provided with verbal education regarding POC.  Understanding was verbalized.     Patient left up in chair with all lines intact, call button in reach, and Wife  present..    GOALS:   Multidisciplinary Problems       Physical Therapy Goals          Problem: Physical Therapy    Goal Priority Disciplines Outcome Goal Variances Interventions   Physical Therapy Goal     PT, PT/OT Ongoing, Progressing     Description: Goals to be met by: 3/17/23     Patient will increase functional independence with mobility by performin. Supine to sit with [place]  2. Sit to supine with [place]  3. Sit to stand transfer with [place]  4. Gait  x 200 feet with [place] using No Assistive Device while maintaining adequate O2 sats.                          Time Tracking:     PT Received On: 23  PT Start Time: 1335     PT Stop Time: 1358  PT Total Time (min): 23 min     Billable Minutes: Therapeutic Activity 8 and Therapeutic Exercise 15    Treatment Type: Treatment  PT/PTA: PTA     PTA Visit Number: 2     2023

## 2023-02-21 PROBLEM — J96.01 ACUTE HYPOXEMIC RESPIRATORY FAILURE: Status: ACTIVE | Noted: 2023-02-21

## 2023-02-21 LAB — BACTERIA SPEC CULT: NORMAL

## 2023-02-21 PROCEDURE — 94799 UNLISTED PULMONARY SVC/PX: CPT

## 2023-02-21 PROCEDURE — 99024 PR POST-OP FOLLOW-UP VISIT: ICD-10-PCS | Mod: ,,, | Performed by: PHYSICIAN ASSISTANT

## 2023-02-21 PROCEDURE — 97535 SELF CARE MNGMENT TRAINING: CPT | Mod: CO

## 2023-02-21 PROCEDURE — 94640 AIRWAY INHALATION TREATMENT: CPT

## 2023-02-21 PROCEDURE — 94761 N-INVAS EAR/PLS OXIMETRY MLT: CPT

## 2023-02-21 PROCEDURE — 25000003 PHARM REV CODE 250: Performed by: INTERNAL MEDICINE

## 2023-02-21 PROCEDURE — 63600175 PHARM REV CODE 636 W HCPCS: Performed by: SPECIALIST

## 2023-02-21 PROCEDURE — 25000003 PHARM REV CODE 250: Performed by: SPECIALIST

## 2023-02-21 PROCEDURE — 63600175 PHARM REV CODE 636 W HCPCS: Performed by: INTERNAL MEDICINE

## 2023-02-21 PROCEDURE — 99900035 HC TECH TIME PER 15 MIN (STAT)

## 2023-02-21 PROCEDURE — 21400001 HC TELEMETRY ROOM

## 2023-02-21 PROCEDURE — 97530 THERAPEUTIC ACTIVITIES: CPT | Mod: CQ

## 2023-02-21 PROCEDURE — 99024 POSTOP FOLLOW-UP VISIT: CPT | Mod: ,,, | Performed by: PHYSICIAN ASSISTANT

## 2023-02-21 PROCEDURE — 25000242 PHARM REV CODE 250 ALT 637 W/ HCPCS: Performed by: PHYSICIAN ASSISTANT

## 2023-02-21 PROCEDURE — 25000003 PHARM REV CODE 250: Performed by: PHYSICIAN ASSISTANT

## 2023-02-21 PROCEDURE — 27100171 HC OXYGEN HIGH FLOW UP TO 24 HOURS

## 2023-02-21 RX ADMIN — IPRATROPIUM BROMIDE AND ALBUTEROL SULFATE 3 ML: 2.5; .5 SOLUTION RESPIRATORY (INHALATION) at 07:02

## 2023-02-21 RX ADMIN — GUAIFENESIN 600 MG: 600 TABLET, EXTENDED RELEASE ORAL at 09:02

## 2023-02-21 RX ADMIN — PREDNISONE 20 MG: 20 TABLET ORAL at 08:02

## 2023-02-21 RX ADMIN — OXYCODONE HYDROCHLORIDE 10 MG: 5 TABLET ORAL at 06:02

## 2023-02-21 RX ADMIN — BENZONATATE 100 MG: 100 CAPSULE ORAL at 06:02

## 2023-02-21 RX ADMIN — ALPRAZOLAM 0.25 MG: 0.25 TABLET ORAL at 06:02

## 2023-02-21 RX ADMIN — IPRATROPIUM BROMIDE AND ALBUTEROL SULFATE 3 ML: 2.5; .5 SOLUTION RESPIRATORY (INHALATION) at 12:02

## 2023-02-21 RX ADMIN — OXYCODONE HYDROCHLORIDE 10 MG: 5 TABLET ORAL at 03:02

## 2023-02-21 RX ADMIN — ALPRAZOLAM 0.25 MG: 0.25 TABLET ORAL at 09:02

## 2023-02-21 RX ADMIN — DOCUSATE SODIUM 100 MG: 100 CAPSULE, LIQUID FILLED ORAL at 08:02

## 2023-02-21 RX ADMIN — ENOXAPARIN SODIUM 40 MG: 40 INJECTION SUBCUTANEOUS at 06:02

## 2023-02-21 RX ADMIN — IPRATROPIUM BROMIDE AND ALBUTEROL SULFATE 3 ML: 2.5; .5 SOLUTION RESPIRATORY (INHALATION) at 01:02

## 2023-02-21 RX ADMIN — MYCOPHENOLATE MOFETIL 1000 MG: 250 CAPSULE ORAL at 09:02

## 2023-02-21 RX ADMIN — MYCOPHENOLATE MOFETIL 1000 MG: 250 CAPSULE ORAL at 08:02

## 2023-02-21 RX ADMIN — FAMOTIDINE 20 MG: 20 TABLET, FILM COATED ORAL at 08:02

## 2023-02-21 RX ADMIN — OXYCODONE HYDROCHLORIDE 10 MG: 5 TABLET ORAL at 08:02

## 2023-02-21 RX ADMIN — PREDNISONE 20 MG: 20 TABLET ORAL at 09:02

## 2023-02-21 RX ADMIN — FAMOTIDINE 20 MG: 20 TABLET, FILM COATED ORAL at 09:02

## 2023-02-21 RX ADMIN — OXYCODONE HYDROCHLORIDE 10 MG: 5 TABLET ORAL at 11:02

## 2023-02-21 RX ADMIN — GUAIFENESIN 600 MG: 600 TABLET, EXTENDED RELEASE ORAL at 08:02

## 2023-02-21 RX ADMIN — DOCUSATE SODIUM 100 MG: 100 CAPSULE, LIQUID FILLED ORAL at 09:02

## 2023-02-21 NOTE — PT/OT/SLP PROGRESS
Occupational Therapy  Treatment    Eagle Patterson   MRN: [id number]   Admitting Diagnosis: <principal problem not specified>    OT Date of Treatment: 02/21/23   OT Start Time: 0930  OT Stop Time: 0944  OT Total Time (min): 14 min      OT/EVELIA: EVELIA     EVELIA Visit Number: 1    General Precautions: Standard, fall  Orthopedic Precautions:    Braces:    O2: vapotherm 80% 20L    Spiritual, Cultural Beliefs, Mandaen Practices, Values that Affect Care: no    Subjective:  Communicated with RN prior to session.      Objective:     Transfer Training:   Sit to stand:Independent with No Assistive Device from chair  Toilet Transfer:  Pt Step Transfer with Independent with No Assistive Device from chair to BR commode. O2 desatting to 88 however easily recovered back to 92%.     Grooming:  Patient peformed hand washing with Independent at standing at sink.    Additional Treatment:  Patient performing sustained pursed lip breathing for 5 counts 10 reps.  Patient perfroming UE exercises to emphasis to shoulder and ribcage widening for 10x.    Patient left up in chair with all lines intact and call button in reach    ASSESSMENT:  Eagle Patterson is a 62 y.o. male with a medical diagnosis of <principal problem not specified>.    Rehab potential is fair    Activity tolerance: Fair    Discharge recommendations: LTACH (long-term acute care hospital)     Equipment recommendations:       GOALS:   Multidisciplinary Problems       Occupational Therapy Goals          Problem: Occupational Therapy    Goal Priority Disciplines Outcome Interventions   Occupational Therapy Goal     OT, PT/OT Ongoing, Progressing    Description: Goals to be met by: 3/10/2023     Patient will increase functional independence with ADLs by performing:    UE Dressing with Modified [place].  LE Dressing with Modified [place].  Grooming while standing at sink with Modified [place].  Toileting from toilet with Modified [place] for hygiene and  clothing management.   Toilet transfer to toilet with Modified [place].  Increased functional strength to WFL for improved participation in ADLs and IADLs.                         Plan:  Patient to be seen 5 x/week, daily to address the above listed problems via self-care/home management, therapeutic activities, therapeutic exercises  Plan of Care expires: 03/10/23  Plan of Care reviewed with: patient, spouse         02/21/2023

## 2023-02-21 NOTE — PT/OT/SLP PROGRESS
Physical Therapy Treatment    Patient Name:  Eagle Patterson   MRN:  [id number]    Recommendations:     Discharge Recommendations: LTACH (long-term acute care hospital)  Discharge Equipment Recommendations: none  Barriers to discharge:  placement    Assessment:     Eagle Patterson is a 62 y.o. male admitted with a medical diagnosis of  s/p R VATS wedge biopsy; post-op respiratory distress.  He presents with the following impairments/functional limitations: weakness, impaired endurance, impaired cardiopulmonary response to activity .    Rehab Prognosis: Good; patient would benefit from acute skilled PT services to address these deficits and reach maximum level of function.    Recent Surgery: Procedure(s) (LRB):  VATS (VIDEO-ASSISTED THORACOSCOPIC SURGERY) (Right) 6 Days Post-Op    Plan:     During this hospitalization, patient to be seen 6 x/week to address the identified rehab impairments via therapeutic activities, therapeutic exercises, gait training and progress toward the following goals:    Plan of Care Expires:  03/17/23    Subjective     Chief Complaint: none  Patient/Family Comments/goals: get better  Pain/Comfort:         Objective:     Communicated with RN prior to session.  Patient found up in chair with oxygen upon PT entry to room.     General Precautions: Standard,    Orthopedic Precautions: N/A  Braces: N/A  Respiratory Status:  Vapotherm 20LMP, 80%  Blood Pressure:   Skin Integrity: Visible skin intact      Functional Mobility:  Transfers:     Sit to Stand:  independence with no AD    Therapeutic Activities/Exercises:  Pt stood no AD for 2 minutes before desatting to 80s. Performed LE therex while UIC. Educated pt on the importance of daily therex.     Education:  Patient provided with verbal education regarding POC.  Understanding was verbalized.     Patient left up in chair with all lines intact and call button in reach..    GOALS:   Multidisciplinary Problems       Physical Therapy Goals           Problem: Physical Therapy    Goal Priority Disciplines Outcome Goal Variances Interventions   Physical Therapy Goal     PT, PT/OT Ongoing, Progressing     Description: Goals to be met by: 3/17/23     Patient will increase functional independence with mobility by performin. Supine to sit with [place]  2. Sit to supine with [place]  3. Sit to stand transfer with [place]  4. Gait  x 200 feet with [place] using No Assistive Device while maintaining adequate O2 sats.                          Time Tracking:     PT Received On: 23  PT Start Time: 1152     PT Stop Time: 1215  PT Total Time (min): 23 min     Billable Minutes: Therapeutic Activity 23    Treatment Type: Treatment  PT/PTA: PTA     PTA Visit Number: 3     2023

## 2023-02-21 NOTE — PROGRESS NOTES
Pulmonary & Critical Care Medicine   Progress Note      Presenting History/HPI:  This is a 61 y/o CM who is a patient of Dr. Ellis with history of esophageal carcinoma treated with chemoradiation at [facility], COVID 2021, and apparently some degree of underlying interstitial lung disease.  In the distant past patient was undergoing workup for ILD was incidentally found to have the above esophageal cancer ILD workup was placed on hold so the patient can proceed with cancer treatment.  Patient was sent back to our clinic for further ILD workup autoimmune labs were ordered, V/Q scan and echocardiogram all were negative the patient continued to have increasing cough and dyspnea despite prednisone 20 mg.  They were agreeable to proceed with surgical lung biopsy patient was then referred to Dr. Abrams he was admitted to [facility] on 02/15/2023 underwent right VATS wedge biopsy of upper and lower lobes.  Postoperatively pulmonary was consulted for further management evaluations.    Pathology returned with interstitial lung disease with extensive fibrosis consistent with usual interstitial pneumonia. He was started on Cellcept on 2/20/23; Also plan to start OFEV as soon as we obtain per Dr. Ellis. Also discussed case with him this AM and he wants to send pathology for a second opinion to [facility].      Interval History:  -patient on Vapotherm 25 L and 75% this morning   -O2 saturations currently in the upper 90s   -he is sitting upright in bedside chair     Scheduled Medications:    albuterol-ipratropium  3 mL Nebulization Q6H    docusate sodium  100 mg Oral BID    enoxaparin  40 mg Subcutaneous Daily    famotidine  20 mg Oral BID    guaiFENesin  600 mg Oral BID    loperamide  4 mg Oral Once    mycophenolate  1,000 mg Oral BID    predniSONE  20 mg Oral BID       PRN Medications:   acetaminophen, albuterol, ALPRAZolam, benzonatate, melatonin, metoclopramide HCl, morphine, ondansetron, oxyCODONE,  oxyCODONE, sodium chloride 0.9%    Fluid Balance:     Intake/Output Summary (Last 24 hours) at 2/21/2023 1125  Last data filed at 2/21/2023 0745  Gross per 24 hour   Intake 920 ml   Output 850 ml   Net 70 ml       Vital Signs:   Vitals:    02/21/23 1108   BP: 105/70   Pulse: (!) 113   Resp: 20   Temp: 97.9 °F (36.6 °C)       Physical Exam  Vitals and nursing note reviewed.   Constitutional:       General: He is not in acute distress.     Appearance: Normal appearance. He is not ill-appearing or toxic-appearing.   HENT:      Head: Normocephalic and atraumatic.      Right Ear: External ear normal.      Left Ear: External ear normal.      Nose: Nose normal.      Mouth/Throat:      Mouth: Mucous membranes are moist.      Pharynx: Oropharynx is clear. No oropharyngeal exudate or posterior oropharyngeal erythema.   Eyes:      General: No scleral icterus.     Extraocular Movements: Extraocular movements intact.      Conjunctiva/sclera: Conjunctivae normal.      Pupils: Pupils are equal, round, and reactive to light.   Neck:      Vascular: No carotid bruit.   Cardiovascular:      Rate and Rhythm: Normal rate and regular rhythm.      Pulses: Normal pulses.      Heart sounds: Normal heart sounds. No murmur heard.    No friction rub. No gallop.   Pulmonary:      Effort: Pulmonary effort is normal. No respiratory distress.      Breath sounds: Rhonchi and rales present. No wheezing.      Comments: Rhonchi and rales heard bilaterally right greater than left predominantly at the bases  Abdominal:      General: Abdomen is flat. Bowel sounds are normal. There is no distension.      Palpations: Abdomen is soft.      Tenderness: There is no abdominal tenderness. There is no guarding or rebound.   Genitourinary:     Comments: deferred  Musculoskeletal:         General: No swelling or deformity. Normal range of motion.      Cervical back: Normal range of motion and neck supple. No rigidity or tenderness.   Lymphadenopathy:       Cervical: No cervical adenopathy.   Skin:     General: Skin is warm and dry.      Capillary Refill: Capillary refill takes less than 2 seconds.      Coloration: Skin is not jaundiced.      Findings: No bruising, lesion or rash.   Neurological:      General: No focal deficit present.      Mental Status: He is alert.      Sensory: No sensory deficit.      Motor: No weakness.   Psychiatric:         Mood and Affect: Mood normal.       Laboratory Studies:   No results for input(s): PH, PCO2, PO2, HCO3, POCSATURATED, BE in the last 24 hours.  No results for input(s): WBC, RBC, HGB, HCT, PLT, MCV, MCH, MCHC in the last 24 hours.  No results for input(s): GLUCOSE, NA, K, CL, CO2, BUN, CREATININE, MG in the last 24 hours.    Invalid input(s):  CALCIUM      Microbiology Data:   Microbiology Results (last 7 days)       Procedure Component Value Units Date/Time    Tissue Culture - Aerobic [[id number]] Collected: 02/15/23 0755    Order Status: Completed Specimen: Tissue from Lung, RLL Updated: 02/20/23 0835     CULTURE, TISSUE- AEROBIC ([facility]) No growth at 4 days    Anaerobic Culture [[id number]] Collected: 02/15/23 0755    Order Status: Completed Specimen: Tissue from Lung, RLL Updated: 02/18/23 0751     Anaerobe Culture No Anaerobes Isolated    AFB Smear [[id number]] Collected: 02/15/23 0755    Order Status: Completed Specimen: Tissue from Lung, RLL Updated: 02/17/23 0816     AFB Smear No AFB seen (Direct smear only)    Gram Stain [[id number]] Collected: 02/15/23 0755    Order Status: Completed Specimen: Tissue from Lung, RLL Updated: 02/16/23 1220     GRAM STAIN No WBCs, No bacteria seen    Fungal Culture [[id number]] Collected: 02/15/23 0755    Order Status: Sent Specimen: Tissue from Lung, RLL Updated: 02/16/23 0949              Imaging:   Echo Saline Bubble? No  · The left ventricle is normal in size with concentric remodeling and   normal systolic function.  · The estimated ejection fraction is 65%.  · Normal left ventricular  diastolic function.  · Normal right ventricular size with normal right ventricular systolic   function.  · Mild tricuspid regurgitation.     X-Ray Chest 1 View  Narrative: EXAMINATION:  XR CHEST 1 VIEW    CLINICAL HISTORY:  post op; Interstitial pulmonary disease, unspecified    TECHNIQUE:  Single frontal view of the chest was performed.    COMPARISON:  02/18/2023    FINDINGS:  LINES AND TUBES: EKG/telemetry leads overlie the chest.    MEDIASTINUM AND CHYNA: The cardiac silhouette is normal.    LUNGS: Low lung volumes.  Bilateral interstitial opacities most pronounced at the lung bases, unchanged.    PLEURA:No pleural effusion.Unchanged small right apical pneumothorax, 5%.    OTHER: No acute osseous abnormality.  Impression: Unchanged small right apical pneumothorax.    Electronically signed by: Corinna Muir  Date:    02/20/2023  Time:    08:52      Assessment and Plan    Assessment:  Acute hypoxemic respiratory failure with postoperative respiratory distress   -interstitial lung disease status post right-sided VATS wedge biopsy of upper and lower lobes  -history of esophageal cancer status post radiation and chemotherapy finishing December 2022   -history of COVID-19 infection in 2021      Plan:  -supplement oxygen to maintain saturation 94-96%   -aggressive PT OT as tolerated    -currently on prednisone 20 mg p.o. b.i.d. continue for now   -transfer to rehab pending at this time  -plan to start ofev per Dr. Ellis   -also plan to send pathology to [facility] for a second opinion with it being a holiday I am not sure how to send today will need to call in th AM     Genna Duckworth ANP  2/21/2023  Pulmonology/Critical Care

## 2023-02-21 NOTE — PROGRESS NOTES
CT SURGERY PROGRESS NOTE  Eagle Patterson  62 y.o.  1960    Patients Procedure: Procedure(s) (LRB):  VATS (VIDEO-ASSISTED THORACOSCOPIC SURGERY) (Right)    Subjective  Interval History: NAD    ROS    Medication List  Infusions    Scheduled   albuterol-ipratropium  3 mL Nebulization Q6H    docusate sodium  100 mg Oral BID    enoxaparin  40 mg Subcutaneous Daily    famotidine  20 mg Oral BID    guaiFENesin  600 mg Oral BID    loperamide  4 mg Oral Once    mycophenolate  1,000 mg Oral BID    predniSONE  20 mg Oral BID       Objective:  Recent Vitals:  Temp:  [97.4 °F (36.3 °C)-98.9 °F (37.2 °C)] 98.9 °F (37.2 °C)  Pulse:  [] 105  Resp:  [18-32] 28  SpO2:  [90 %-100 %] 98 %  BP: ()/(65-78) 108/73    Physical Exam     I/O last 24 hrs:  Intake/Output - Last 3 Shifts         02/19 0700  02/20 0659 02/20 0700  02/21 0659 02/21 0700  02/22 0659    P.O. 1800 680 240    Total Intake(mL/kg) 1800 (24.8) 680 (9.4) 240 (3.3)    Urine (mL/kg/hr) 1000 (0.6) 850 (0.5)     Stool 0 0     Total Output 1000 850     Net +800 -170 +240           Urine Occurrence 5 x      Stool Occurrence 1 x 1 x             Labs  BMP: No results for input(s): GLU, NA, K, CL, CO2, BUN, CREATININE, CALCIUM, MG in the last 48 hours.  CBC: No results for input(s): WBC, RBC, HGB, HCT, PLT, MCV, MCH, MCHC in the last 48 hours.  CMP: No results for input(s): GLU, CALCIUM, ALBUMIN, PROT, NA, K, CO2, CL, BUN, CREATININE, ALKPHOS, ALT, AST, BILITOT in the last 48 hours.      Imaging:   CXR: No results found in the last 24 hours.        ASSESSMENT/PLAN:    DC to Rehab ASAP    Case and plan of care discussed with MD Oracio Bedolla PA-C

## 2023-02-22 VITALS
HEART RATE: 107 BPM | WEIGHT: 159.63 LBS | HEIGHT: 70 IN | RESPIRATION RATE: 24 BRPM | TEMPERATURE: 97 F | DIASTOLIC BLOOD PRESSURE: 84 MMHG | BODY MASS INDEX: 22.85 KG/M2 | SYSTOLIC BLOOD PRESSURE: 124 MMHG | OXYGEN SATURATION: 97 %

## 2023-02-22 PROCEDURE — 63600175 PHARM REV CODE 636 W HCPCS: Performed by: INTERNAL MEDICINE

## 2023-02-22 PROCEDURE — 25000242 PHARM REV CODE 250 ALT 637 W/ HCPCS: Performed by: PHYSICIAN ASSISTANT

## 2023-02-22 PROCEDURE — 27000249 HC VAPOTHERM CIRCUIT

## 2023-02-22 PROCEDURE — 94640 AIRWAY INHALATION TREATMENT: CPT

## 2023-02-22 PROCEDURE — 25000003 PHARM REV CODE 250: Performed by: SPECIALIST

## 2023-02-22 PROCEDURE — 25000003 PHARM REV CODE 250: Performed by: INTERNAL MEDICINE

## 2023-02-22 PROCEDURE — 27100171 HC OXYGEN HIGH FLOW UP TO 24 HOURS

## 2023-02-22 PROCEDURE — 99024 PR POST-OP FOLLOW-UP VISIT: ICD-10-PCS | Mod: ,,, | Performed by: PHYSICIAN ASSISTANT

## 2023-02-22 PROCEDURE — 25000003 PHARM REV CODE 250: Performed by: PHYSICIAN ASSISTANT

## 2023-02-22 PROCEDURE — 99900031 HC PATIENT EDUCATION (STAT)

## 2023-02-22 PROCEDURE — 99024 POSTOP FOLLOW-UP VISIT: CPT | Mod: ,,, | Performed by: PHYSICIAN ASSISTANT

## 2023-02-22 PROCEDURE — 94761 N-INVAS EAR/PLS OXIMETRY MLT: CPT

## 2023-02-22 RX ORDER — OXYCODONE HYDROCHLORIDE 5 MG/1
5 TABLET ORAL EVERY 4 HOURS PRN
Qty: 40 TABLET | Refills: 0 | Status: ON HOLD
Start: 2023-02-22 | End: 2023-03-15 | Stop reason: SDUPTHER

## 2023-02-22 RX ORDER — ENOXAPARIN SODIUM 100 MG/ML
40 INJECTION SUBCUTANEOUS EVERY 12 HOURS
Qty: 60 ML | Refills: 0 | Status: ON HOLD | OUTPATIENT
Start: 2023-02-22 | End: 2023-03-15 | Stop reason: HOSPADM

## 2023-02-22 RX ORDER — PREDNISONE 20 MG/1
20 TABLET ORAL 2 TIMES DAILY
Qty: 60 TABLET | Refills: 0 | Status: ON HOLD | OUTPATIENT
Start: 2023-02-22 | End: 2023-03-15

## 2023-02-22 RX ADMIN — PREDNISONE 20 MG: 20 TABLET ORAL at 08:02

## 2023-02-22 RX ADMIN — OXYCODONE HYDROCHLORIDE 10 MG: 5 TABLET ORAL at 11:02

## 2023-02-22 RX ADMIN — ALPRAZOLAM 0.25 MG: 0.25 TABLET ORAL at 05:02

## 2023-02-22 RX ADMIN — MYCOPHENOLATE MOFETIL 1000 MG: 250 CAPSULE ORAL at 08:02

## 2023-02-22 RX ADMIN — DOCUSATE SODIUM 100 MG: 100 CAPSULE, LIQUID FILLED ORAL at 08:02

## 2023-02-22 RX ADMIN — BENZONATATE 100 MG: 100 CAPSULE ORAL at 04:02

## 2023-02-22 RX ADMIN — IPRATROPIUM BROMIDE AND ALBUTEROL SULFATE 3 ML: 2.5; .5 SOLUTION RESPIRATORY (INHALATION) at 01:02

## 2023-02-22 RX ADMIN — IPRATROPIUM BROMIDE AND ALBUTEROL SULFATE 3 ML: 2.5; .5 SOLUTION RESPIRATORY (INHALATION) at 07:02

## 2023-02-22 RX ADMIN — FAMOTIDINE 20 MG: 20 TABLET, FILM COATED ORAL at 08:02

## 2023-02-22 RX ADMIN — GUAIFENESIN 600 MG: 600 TABLET, EXTENDED RELEASE ORAL at 08:02

## 2023-02-22 RX ADMIN — OXYCODONE HYDROCHLORIDE 10 MG: 5 TABLET ORAL at 04:02

## 2023-02-22 NOTE — PLAN OF CARE
Problem: Adult Inpatient Plan of Care  Goal: Plan of Care Review  2/22/2023 0706 by Samreen Zafar RN  Outcome: Ongoing, Progressing  2/22/2023 0706 by Samreen Zafar RN  Outcome: Ongoing, Progressing  Goal: Patient-Specific Goal (Individualized)  2/22/2023 0706 by Samreen Zafar RN  Outcome: Ongoing, Progressing  2/22/2023 0706 by Samreen Zafar RN  Outcome: Ongoing, Progressing  Goal: Absence of Hospital-Acquired Illness or Injury  2/22/2023 0706 by Samreen Zafar RN  Outcome: Ongoing, Progressing  2/22/2023 0706 by Samreen Zafar RN  Outcome: Ongoing, Progressing  Goal: Optimal Comfort and Wellbeing  2/22/2023 0706 by Samreen Zafar RN  Outcome: Ongoing, Progressing  2/22/2023 0706 by Samreen Zafar RN  Outcome: Ongoing, Progressing  Goal: Readiness for Transition of Care  2/22/2023 0706 by Samreen Zafar RN  Outcome: Ongoing, Progressing  2/22/2023 0706 by Samreen Zafar RN  Outcome: Ongoing, Progressing     Problem: Infection  Goal: Absence of Infection Signs and Symptoms  2/22/2023 0706 by Samreen Zafar RN  Outcome: Ongoing, Progressing  2/22/2023 0706 by Samreen Zafar RN  Outcome: Ongoing, Progressing     Problem: Fall Injury Risk  Goal: Absence of Fall and Fall-Related Injury  2/22/2023 0706 by Samreen Zafar RN  Outcome: Ongoing, Progressing  2/22/2023 0706 by Samreen Zafar RN  Outcome: Ongoing, Progressing     Problem: Gas Exchange Impaired  Goal: Optimal Gas Exchange  2/22/2023 0706 by Samreen Zafar RN  Outcome: Ongoing, Progressing  2/22/2023 0706 by Samreen Zafar RN  Outcome: Ongoing, Progressing     Problem: Skin Injury Risk Increased  Goal: Skin Health and Integrity  2/22/2023 0706 by Samreen Zafar RN  Outcome: Ongoing, Progressing  2/22/2023 0706 by Samreen Zafar RN  Outcome: Ongoing, Progressing

## 2023-02-22 NOTE — DISCHARGE SUMMARY
[facility] 3rd Floor Medical Telemetry  Cardiothoracic Surgery  Discharge Summary      Patient Name: Eagle Patterson  MRN: [id number]  Admission Date: 2/15/2023  Hospital Length of Stay: 7 days  Discharge Date and Time:  02/22/2023 9:12 AM  Attending Physician: Sundeep Mcconnell IV, MD   Discharging Provider: LYN Robb  Primary Care Provider: Chuck Lawrence MD    HPI:   No notes on file    Procedure(s) (LRB):  VATS (VIDEO-ASSISTED THORACOSCOPIC SURGERY) (Right)      Indwelling Lines/Drains at time of discharge:   Lines/Drains/Airways     None               Hospital Course: No notes on file    Goals of Care Treatment Preferences:         Consults (From admission, onward)        Status Ordering Provider     Inpatient consult to Pulmonology  Once        Provider:  Maximo Ellis MD    Completed SUNDEEP MCCONNELL IV          Significant Diagnostic Studies: Labs: All labs within the past 24 hours have been reviewed    Pending Diagnostic Studies:     Procedure Component Value Units Date/Time    Specimen to Pathology Cardiovascular [[id number]]     Order Status: Sent Lab Status: No result     Specimen: Tissue           No notes have been filed under this hospital service.  Service: Cardiothoracic Surgery    Final Active Diagnoses:    Diagnosis Date Noted POA    PRINCIPAL PROBLEM:  Acute hypoxemic respiratory failure [J96.01] 02/21/2023 Yes      Problems Resolved During this Admission:      Discharged Condition: good    Disposition: Home or Self Care    Follow Up:    Patient Instructions:   No discharge procedures on file.  Medications:  Reconciled Home Medications:      Medication List      START taking these medications    oxyCODONE 5 MG immediate release tablet  Commonly known as: ROXICODONE  Take 1 tablet (5 mg total) by mouth every 4 (four) hours as needed for Pain.        CHANGE how you take these medications    ARMOUR THYROID 60 mg Tab  Generic drug: thyroid (pork)  Take 60 mg by  mouth once daily.  What changed: Another medication with the same name was removed. Continue taking this medication, and follow the directions you see here.     enoxaparin 40 mg/0.4 mL Syrg  Commonly known as: LOVENOX  Inject 0.4 mLs (40 mg total) into the skin every 12 (twelve) hours.  What changed:   · medication strength  · how much to take  · when to take this     esomeprazole 40 MG capsule  Commonly known as: NEXIUM  Take 40 mg by mouth.  What changed: Another medication with the same name was removed. Continue taking this medication, and follow the directions you see here.     montelukast 10 mg tablet  Commonly known as: SINGULAIR  Take 1 tablet (10 mg total) by mouth once daily.  What changed: Another medication with the same name was removed. Continue taking this medication, and follow the directions you see here.     predniSONE 20 MG tablet  Commonly known as: DELTASONE  Take 1 tablet (20 mg total) by mouth 2 (two) times daily.  What changed:   · medication strength  · how much to take  · when to take this        CONTINUE taking these medications    albuterol 90 mcg/actuation inhaler  Commonly known as: PROAIR HFA  Inhale 2 puffs into the lungs every 6 (six) hours as needed for Wheezing. Rescue     aluminum-magnesium hydroxide-simethicone 200-200-20 mg/5 mL Susp  Commonly known as: MAALOX  Take by mouth.     benzonatate 100 MG capsule  Commonly known as: TESSALON  Take 100 mg by mouth 3 (three) times daily as needed for Cough.     cholecalciferol (vitamin D3) 125 mcg (5,000 unit) Tab  Take 400 Units by mouth.     omeprazole-sodium bicarbonate 40-1.1 mg-gram per capsule  Commonly known as: ZEGERID  Take 1 capsule by mouth as needed.        STOP taking these medications    hydrocortisone 10 MG Tab  Commonly known as: CORTEF     icosapent ethyL 1 gram Cap  Commonly known as: VASCEPA     sulfamethoxazole-trimethoprim 800-160mg 800-160 mg Tab  Commonly known as: BACTRIM DS          Time spent on the discharge of  patient: 33 minutes    LYN Robb  Cardiothoracic Surgery  [facility] - 3rd Floor Medical Telemetry

## 2023-02-22 NOTE — PLAN OF CARE
Problem: Adult Inpatient Plan of Care  Goal: Plan of Care Review  Outcome: Ongoing, Progressing  Flowsheets (Taken 2/21/2023 1831)  Plan of Care Reviewed With:   patient   spouse  Goal: Absence of Hospital-Acquired Illness or Injury  Outcome: Ongoing, Progressing  Intervention: Identify and Manage Fall Risk  Flowsheets (Taken 2/21/2023 1831)  Safety Promotion/Fall Prevention:   assistive device/personal item within reach   Fall Risk signage in place   Fall Risk reviewed with patient/family   in recliner, wheels locked   instructed to call staff for mobility

## 2023-02-22 NOTE — PROGRESS NOTES
Pulmonary    Path results reviewed. 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 Patient's course is not consistent with a diagnosis of UIP/IPF. It was far to rapidly progressive and also appeared to be clinically and radiographically responsive to steroids at [facility]. Autoimmune workup has been negative to this point but with the presence of ground glass opacities on CT scan, and rate of progression, I cannot agree that UIP/IPF represents the sole or primary diagnosis. Will have the path sent out for second opinion. Perhaps there is a background of UIP/IPF with a superimposed process. In further discussions he does admit to episodes of aspiration intermittently but there is nothing on path to believe that this is the driving process. Continuing prednisone. Starting Cellcept. Will start arrangements to obtain Ofev as it is now approved for any progressive fibrotic pulmonary process. Hopefully we can identify another therapeutic avenue because if this is just UIP/IPF then Ofev and lung transplant will be the only options

## 2023-02-22 NOTE — PLAN OF CARE
Patient will be transferred to [facility], transportation has been set up.

## 2023-02-22 NOTE — PROGRESS NOTES
Pulmonary & Critical Care Medicine   Progress Note      Presenting History/HPI:  This is a 63 y/o CM who is a patient of Dr. Ellis with history of esophageal carcinoma treated with chemoradiation at [facility] [place], COVID 2021, and apparently some degree of underlying interstitial lung disease.  In the distant past patient was undergoing workup for ILD was incidentally found to have the above esophageal cancer ILD workup was placed on hold so the patient can proceed with cancer treatment.  Patient was sent back to our clinic for further ILD workup autoimmune labs were ordered, V/Q scan and echocardiogram all were negative the patient continued to have increasing cough and dyspnea despite prednisone 20 mg.  They were agreeable to proceed with surgical lung biopsy patient was then referred to Dr. Abrams he was admitted to [facility] on 02/15/2023 underwent right VATS wedge biopsy of upper and lower lobes.  Postoperatively pulmonary was consulted for further management evaluations.     Pathology returned with interstitial lung disease with extensive fibrosis consistent with usual interstitial pneumonia. He was started on Cellcept on 2/20/23; Also plan to start OFEV as soon as we obtain per Dr. Ellis. Also discussed case with him this AM and he wants to send pathology for a second opinion to [facility].        Interval History:  -Patient on 20 L and 75% FiO2 on Vapotherm   -plans to transfer to LTAC today   -patient complaining of continuous nonproductive cough not responding to Tessalon Perles  -patient is started on CellCept on 02/21 by Dr. Ellis    Scheduled Medications:    albuterol-ipratropium  3 mL Nebulization Q6H    docusate sodium  100 mg Oral BID    enoxaparin  40 mg Subcutaneous Daily    famotidine  20 mg Oral BID    guaiFENesin  600 mg Oral BID    loperamide  4 mg Oral Once    mycophenolate  1,000 mg Oral BID    predniSONE  20 mg Oral BID       PRN Medications:   acetaminophen,  albuterol, ALPRAZolam, benzonatate, melatonin, metoclopramide HCl, morphine, ondansetron, oxyCODONE, oxyCODONE, sodium chloride 0.9%      Infusions:          Fluid Balance:     Intake/Output Summary (Last 24 hours) at 2/22/2023 1030  Last data filed at 2/22/2023 0439  Gross per 24 hour   Intake 720 ml   Output 1070 ml   Net -350 ml           Vital Signs:   Vitals:    02/22/23 0800   BP:    Pulse: 105   Resp: 16   Temp:          Physical Exam  Vitals and nursing note reviewed.   Constitutional:       General: He is not in acute distress.     Appearance: Normal appearance. He is not ill-appearing or toxic-appearing.   HENT:      Head: Normocephalic and atraumatic.      Right Ear: External ear normal.      Left Ear: External ear normal.      Nose: Nose normal.      Mouth/Throat:      Mouth: Mucous membranes are moist.      Pharynx: Oropharynx is clear. No oropharyngeal exudate or posterior oropharyngeal erythema.   Eyes:      General: No scleral icterus.     Extraocular Movements: Extraocular movements intact.      Conjunctiva/sclera: Conjunctivae normal.      Pupils: Pupils are equal, round, and reactive to light.   Neck:      Vascular: No carotid bruit.   Cardiovascular:      Rate and Rhythm: Normal rate and regular rhythm.      Pulses: Normal pulses.      Heart sounds: Normal heart sounds. No murmur heard.    No friction rub. No gallop.   Pulmonary:      Effort: Pulmonary effort is normal. No respiratory distress.      Breath sounds: Rales present. No wheezing or rhonchi.      Comments: Crackles at bilateral bases right greater than left  Abdominal:      General: Abdomen is flat. Bowel sounds are normal. There is no distension.      Palpations: Abdomen is soft.      Tenderness: There is no abdominal tenderness. There is no guarding or rebound.   Genitourinary:     Comments: deferred  Musculoskeletal:         General: No swelling or deformity. Normal range of motion.      Cervical back: Normal range of motion and  neck supple. No rigidity or tenderness.   Lymphadenopathy:      Cervical: No cervical adenopathy.   Skin:     General: Skin is warm and dry.      Capillary Refill: Capillary refill takes less than 2 seconds.      Coloration: Skin is not jaundiced.      Findings: No bruising, lesion or rash.   Neurological:      General: No focal deficit present.      Mental Status: He is alert.      Sensory: No sensory deficit.      Motor: No weakness.   Psychiatric:         Mood and Affect: Mood normal.       Laboratory Studies:   No results for input(s): PH, PCO2, PO2, HCO3, POCSATURATED, BE in the last 24 hours.  No results for input(s): WBC, RBC, HGB, HCT, PLT, MCV, MCH, MCHC in the last 24 hours.  No results for input(s): GLUCOSE, NA, K, CL, CO2, BUN, CREATININE, MG in the last 24 hours.    Invalid input(s):  CALCIUM      Microbiology Data:   Microbiology Results (last 7 days)       Procedure Component Value Units Date/Time    Tissue Culture - Aerobic [[id number]] Collected: 02/15/23 0755    Order Status: Completed Specimen: Tissue from Lung, RLL Updated: 02/21/23 1256     CULTURE, TISSUE- AEROBIC ([facility]) No Growth at 5 days    Anaerobic Culture [[id number]] Collected: 02/15/23 0755    Order Status: Completed Specimen: Tissue from Lung, RLL Updated: 02/18/23 0751     Anaerobe Culture No Anaerobes Isolated    AFB Smear [[id number]] Collected: 02/15/23 0755    Order Status: Completed Specimen: Tissue from Lung, RLL Updated: 02/17/23 0816     AFB Smear No AFB seen (Direct smear only)    Gram Stain [[id number]] Collected: 02/15/23 0755    Order Status: Completed Specimen: Tissue from Lung, RLL Updated: 02/16/23 1220     GRAM STAIN No WBCs, No bacteria seen    Fungal Culture [[id number]] Collected: 02/15/23 0755    Order Status: Sent Specimen: Tissue from Lung, RLL Updated: 02/16/23 0949              Imaging:   Echo Saline Bubble? No  · The left ventricle is normal in size with concentric remodeling and   normal systolic function.  · The  estimated ejection fraction is 65%.  · Normal left ventricular diastolic function.  · Normal right ventricular size with normal right ventricular systolic   function.  · Mild tricuspid regurgitation.     X-Ray Chest 1 View  Narrative: EXAMINATION:  XR CHEST 1 VIEW    CLINICAL HISTORY:  post op; Interstitial pulmonary disease, unspecified    TECHNIQUE:  Single frontal view of the chest was performed.    COMPARISON:  02/18/2023    FINDINGS:  LINES AND TUBES: EKG/telemetry leads overlie the chest.    MEDIASTINUM AND CHYNA: The cardiac silhouette is normal.    LUNGS: Low lung volumes.  Bilateral interstitial opacities most pronounced at the lung bases, unchanged.    PLEURA:No pleural effusion.Unchanged small right apical pneumothorax, 5%.    OTHER: No acute osseous abnormality.  Impression: Unchanged small right apical pneumothorax.    Electronically signed by: Corinna Muir  Date:    02/20/2023  Time:    08:52          Assessment and Plan    Assessment:  -acute hypoxemic respiratory failure with postoperative respiratory distress  -interstitial lung disease status post right-sided VATS wedge biopsy of upper and lower lobes now with pathology positive for suspected UIP with plans to send out for 2nd opinion given lack of correlation with imaging with ground-glass opacities   -history of esophageal cancer status post radiation and chemotherapy finishing in December 2022   -history of COVID-19 infection in 2021      Plan:  -supplement oxygen to maintain saturation 94-96%   -discharge to LTAC today, continue aggressive PT OT and oxygen weaning   -pathology from lung biopsy positive for UIP, per Dr. Ellis's note there were ground-glass opacities on imaging which I agree are not consistent with a diagnosis of UIP - in addition to initial steroid responsiveness, despite the pathologic diagnosis tissue will be sent out to [facility] for 2nd opinion as there may be another superimposed of disease process to explain his  current clinical condition, patient started on CellCept per Dr. Ellis on 02/21, continue for now, prednisone discontinued, planning to start the patient on Ofev which will be coordinated through our clinic        Mikhail Roca MD  2/22/2023  Pulmonology/Critical Care

## 2023-02-22 NOTE — NURSING
"Spoke to Nereida in Micro regarding the order to send the lung tissue pathology to [facility] for a second opinion. She stated, "I will leave a message for pathology to look into it tomorrow."   "

## 2023-02-22 NOTE — NURSING
Patient and family educated on discharge to LTAC today. Patient being transported to LTAC via [facility] on vapotherm. VSS. NAD. No complaints of pain. IV and Tele discontinued. Report called to LTAC. Patient verbalized understanding of all discharge information. Patient denies any further needs.

## 2023-03-03 NOTE — PHYSICIAN QUERY
PT Name: Eagle Patterson  MR #: [id number]     DOCUMENTATION CLARIFICATION     CDS/: Nidhi Tang RN              Contact information: [email or web address]  This form is a permanent document in the medical record.     Query Date: March 3, 2023    By submitting this query, we are merely seeking further clarification of documentation.  Please utilize your independent clinical judgment when addressing the question(s) below.  The Medical Record contains the following   Indicators   Supporting Clinical Findings Location in Medical Record   X SOB, PASCUAL, Wheezing, Productive Cough, Use of Accessory Muscles, etc. He gets short of breath with minimal activity and has a chronic cough    Patient  reports he has noticed a progression of SOB since stopping steroids recently.    Breath sounds: Wheezing, rhonchi and rales present.   Interval H&P 2/15    Pulm PN 2/16    Pulm PN 2/17     X RR         ABGs         O2 sat RR:  02-95-07-90-19-84-00-55-67-88-61-37-20-98-52-32-26    O2 sat:  89-08-67-96-18-83-13-88-26-88-57-39-84-20-57-90-92-94 Flow sheets 2/17-2/20      Flow sheets 2/17-2/20    Hypoxia/Hypercapnia      BiPAP/Intubation/Mechanical Ventilation     X Supplemental O2 3L NC then Oxymask at 12L/min then 4l/min     currently on Vapotherm 25 L/100% in attempts to prevent atelectasis    Patient on 20 L and 75% FiO2 on Vapotherm Pulm PN 2/16    Pulm PN 2/19    Pulm PN 2/22     X Home O2, Oxygen Dependence He is currently walking around with no oxygen but does require oxygen most of the day   Interval H&P   X Respiratory distress or failure postoperative respiratory distress, acute hypoxemic respiratory failure    Acute hypoxemic respiratory failure with postoperative respiratory distress    PRINCIPAL PROBLEM:  Acute hypoxemic respiratory failure   Pulm PN 2/17    Pulm PN 2/20,2/21,2/22    DCS 2/22   X Radiology findings CXR:  Impression:  Unchanged right apical pneumothorax.  Unchanged bibasilar interstitial  opacities.    CXR:  Impression:  Unchanged small right apical pneumothorax    CXR:  Impression:  As above.  Worsening left lower lobe opacification.  Right apical pneumothorax is grossly unchanged.    CXR 2/17          CXR 2/22        CXR 2/23       X Acute/Chronic Illness This is a 63 y/o CM who is a patient of Dr. Ellis with history of esophageal carcinoma treated with chemoradiation at [facility] [place], COVID 2021, and apparently some degree of underlying interstitial lung disease    - interstitial lung disease status post right-sided VATS wedge biopsy of upper and lower lobes now with pathology positive for suspected UIP with plans to send out for  2nd opinion given lack of correlation with imaging with ground-glass opacities   Pulm PN 2/22   X Treatment supplement oxygen to maintain saturation 94-96%  -discharge to LTAC today, continue aggressive PT OT and oxygen weaning Pulm PN 2/22       X Other  Right VATS wedge biopsy of upper and lower lobes   Op Note 2/15       The noted clinical guidelines are only system guidelines and do not replace the providers clinical judgment.    Provider, due to conflicting clinical picture, please clarify the respiratory diagnosis  associated with above clinical findings.     [    ] Acute Respiratory Failure with Hypoxia - ABG pO2 < 60 mmHg or O2 sat of <91% on room air and respiratory symptoms documented     [  X  ] Acute Post Op Respiratory Failure-   Due to condition other than surgery - Unexpected or unusual for that procedure but a pre-existing medical condition is present and is contributing. Please specify condition: _Interstitial lung disease______________  Common Examples: COPD, CHF, a neuromuscular disorder or degenerative neurological disorder     [    ] Acute Post Op Respiratory Failure-  Complication of surgery or procedure     [    ] Acute Post Op Respiratory Failure- Complication of anesthesia     [    ] Other Respiratory Diagnosis (please specify):  _________________         Please document in your progress notes daily for the duration of treatment until resolved and include in your discharge summary.     Reference:    BETTINA Andre MD. (2020, March 13). Acute respiratory distress syndrome: Clinical features, diagnosis, and complications in adults ([id number] GUANACO Hernandez MD & [id number] GELY Cruz MD, Eds.). Retrieved November 13, 2020, from https://[email or web address][employer]/contents/acute-respiratory-distress-syndrome-clinical-features-diagnosis-and-complications-[email or web address]-adults?search=ards&source=search_result&selectedTitle=1~150&usage_type=default&display_rank=1  Form No. [id number]

## 2023-03-13 LAB — PSYCHE PATHOLOGY RESULT: NORMAL

## 2023-03-20 LAB — FUNGUS SPEC CULT: NORMAL

## 2023-05-29 PROBLEM — J96.01 ACUTE HYPOXEMIC RESPIRATORY FAILURE: Status: RESOLVED | Noted: 2023-02-21 | Resolved: 2023-05-29

## (undated) DEVICE — SOL IRRI STRL WATER 1000ML

## (undated) DEVICE — RELOAD GREEN FOR ECHELON

## (undated) DEVICE — TUBING INSUFFLATOR W/ROT CONCT

## (undated) DEVICE — CORD LAP 10 DISP

## (undated) DEVICE — KIT SURGICAL TURNOVER

## (undated) DEVICE — RELOAD ECHELON ENDOPATH 45MM

## (undated) DEVICE — GLOVE PROTEXIS HYDROGEL SZ6.5

## (undated) DEVICE — GLOVE PROTEXIS LTX MICRO 8

## (undated) DEVICE — DRESSING TELFA + RECT 6X10IN

## (undated) DEVICE — IRRIGATOR SUCTION W/TIP

## (undated) DEVICE — CUSHION  WC FOAM 20X20X.75IN

## (undated) DEVICE — STAPLER ECHELON FLEX GST 45MM

## (undated) DEVICE — Device

## (undated) DEVICE — TROCAR ENDOPATH XCEL 11MM 10CM

## (undated) DEVICE — ELECTRODE PATIENT RETURN DISP

## (undated) DEVICE — SUT VICRYL 3-0 8-18 PS-1

## (undated) DEVICE — CANNULA ENDOPATH XCEL 5X100MM

## (undated) DEVICE — APPLICATOR CHLORAPREP ORN 26ML

## (undated) DEVICE — DRAPE SLUSH WARMER 66X44IN

## (undated) DEVICE — GLOVE PROTEXIS BLUE LATEX 7

## (undated) DEVICE — KIT ANTIFOG W/SPONG & FLUID

## (undated) DEVICE — TRAY CATH FOL SIL URIMTR 16FR

## (undated) DEVICE — GLOVE SURG BIOGEL LATEX SZ 7.5

## (undated) DEVICE — GLOVE PROTEXIS PI SYN SURG 7.5

## (undated) DEVICE — SUT VICRYL 2-0 27 CT-1

## (undated) DEVICE — ELECTRODE BLD EXT 6.50 ST DISP

## (undated) DEVICE — CATH THOR SIL STR 6 EYELT 28FR

## (undated) DEVICE — DRAIN CHEST DRY SUCTION

## (undated) DEVICE — DRAPE STERI INCISE 23X23IN

## (undated) DEVICE — REINFORCEMENT STAPLE LINE 60MM

## (undated) DEVICE — BAG SPEC RETRV ENDO 4X6IN DISP

## (undated) DEVICE — SUT ETHBND XTRA 1 OS-8 30IN